# Patient Record
Sex: FEMALE | Race: WHITE | NOT HISPANIC OR LATINO | Employment: OTHER | URBAN - METROPOLITAN AREA
[De-identification: names, ages, dates, MRNs, and addresses within clinical notes are randomized per-mention and may not be internally consistent; named-entity substitution may affect disease eponyms.]

---

## 2023-01-01 ENCOUNTER — HOSPITAL ENCOUNTER (EMERGENCY)
Facility: CLINIC | Age: 68
Discharge: ANOTHER HOSPITAL | DRG: 057 | End: 2023-11-17
Attending: STUDENT IN AN ORGANIZED HEALTH CARE EDUCATION/TRAINING PROGRAM | Admitting: STUDENT IN AN ORGANIZED HEALTH CARE EDUCATION/TRAINING PROGRAM
Payer: COMMERCIAL

## 2023-01-01 ENCOUNTER — APPOINTMENT (OUTPATIENT)
Dept: CT IMAGING | Facility: CLINIC | Age: 68
End: 2023-01-01
Attending: EMERGENCY MEDICINE
Payer: COMMERCIAL

## 2023-01-01 ENCOUNTER — DOCUMENTATION ONLY (OUTPATIENT)
Dept: OTHER | Facility: CLINIC | Age: 68
End: 2023-01-01
Payer: COMMERCIAL

## 2023-01-01 ENCOUNTER — APPOINTMENT (OUTPATIENT)
Dept: GENERAL RADIOLOGY | Facility: CLINIC | Age: 68
DRG: 057 | End: 2023-01-01
Attending: INTERNAL MEDICINE
Payer: COMMERCIAL

## 2023-01-01 ENCOUNTER — TRANSFERRED RECORDS (OUTPATIENT)
Dept: HEALTH INFORMATION MANAGEMENT | Facility: CLINIC | Age: 68
End: 2023-01-01

## 2023-01-01 ENCOUNTER — APPOINTMENT (OUTPATIENT)
Dept: PHYSICAL THERAPY | Facility: CLINIC | Age: 68
DRG: 057 | End: 2023-01-01
Attending: INTERNAL MEDICINE
Payer: COMMERCIAL

## 2023-01-01 ENCOUNTER — APPOINTMENT (OUTPATIENT)
Dept: SPEECH THERAPY | Facility: CLINIC | Age: 68
End: 2023-01-01
Attending: INTERNAL MEDICINE
Payer: COMMERCIAL

## 2023-01-01 ENCOUNTER — MEDICAL CORRESPONDENCE (OUTPATIENT)
Dept: HEALTH INFORMATION MANAGEMENT | Facility: CLINIC | Age: 68
End: 2023-01-01

## 2023-01-01 ENCOUNTER — LAB REQUISITION (OUTPATIENT)
Dept: LAB | Facility: CLINIC | Age: 68
End: 2023-01-01

## 2023-01-01 ENCOUNTER — HOSPITAL ENCOUNTER (INPATIENT)
Facility: CLINIC | Age: 68
LOS: 3 days | Discharge: NURSING, REHAB OR LONG-TERM CARE | DRG: 057 | End: 2023-11-20
Attending: INTERNAL MEDICINE | Admitting: INTERNAL MEDICINE
Payer: COMMERCIAL

## 2023-01-01 ENCOUNTER — APPOINTMENT (OUTPATIENT)
Dept: CARDIOLOGY | Facility: CLINIC | Age: 68
End: 2023-01-01
Attending: NURSE PRACTITIONER
Payer: COMMERCIAL

## 2023-01-01 ENCOUNTER — APPOINTMENT (OUTPATIENT)
Dept: CT IMAGING | Facility: CLINIC | Age: 68
End: 2023-01-01
Attending: STUDENT IN AN ORGANIZED HEALTH CARE EDUCATION/TRAINING PROGRAM
Payer: COMMERCIAL

## 2023-01-01 ENCOUNTER — APPOINTMENT (OUTPATIENT)
Dept: CT IMAGING | Facility: CLINIC | Age: 68
DRG: 057 | End: 2023-01-01
Attending: STUDENT IN AN ORGANIZED HEALTH CARE EDUCATION/TRAINING PROGRAM
Payer: COMMERCIAL

## 2023-01-01 ENCOUNTER — HOSPITAL ENCOUNTER (EMERGENCY)
Facility: CLINIC | Age: 68
Discharge: HOME | End: 2023-11-05
Payer: COMMERCIAL

## 2023-01-01 ENCOUNTER — LAB REQUISITION (OUTPATIENT)
Dept: LAB | Facility: CLINIC | Age: 68
End: 2023-01-01
Payer: COMMERCIAL

## 2023-01-01 ENCOUNTER — APPOINTMENT (OUTPATIENT)
Dept: SPEECH THERAPY | Facility: CLINIC | Age: 68
DRG: 057 | End: 2023-01-01
Attending: INTERNAL MEDICINE
Payer: COMMERCIAL

## 2023-01-01 ENCOUNTER — HOSPITAL ENCOUNTER (OUTPATIENT)
Facility: CLINIC | Age: 68
Setting detail: OBSERVATION
Discharge: OTHER INSTITUTION | End: 2023-11-07
Attending: EMERGENCY MEDICINE | Admitting: INTERNAL MEDICINE
Payer: COMMERCIAL

## 2023-01-01 ENCOUNTER — HOSPITAL ENCOUNTER (EMERGENCY)
Facility: CLINIC | Age: 68
Discharge: HOME | End: 2023-09-05
Attending: STUDENT IN AN ORGANIZED HEALTH CARE EDUCATION/TRAINING PROGRAM | Admitting: STUDENT IN AN ORGANIZED HEALTH CARE EDUCATION/TRAINING PROGRAM
Payer: COMMERCIAL

## 2023-01-01 ENCOUNTER — APPOINTMENT (OUTPATIENT)
Dept: CT IMAGING | Facility: CLINIC | Age: 68
End: 2023-01-01
Attending: NURSE PRACTITIONER
Payer: COMMERCIAL

## 2023-01-01 ENCOUNTER — DOCUMENTATION ONLY (OUTPATIENT)
Dept: OTHER | Facility: CLINIC | Age: 68
End: 2023-01-01

## 2023-01-01 VITALS
HEART RATE: 79 BPM | TEMPERATURE: 99.9 F | SYSTOLIC BLOOD PRESSURE: 150 MMHG | RESPIRATION RATE: 16 BRPM | OXYGEN SATURATION: 99 % | DIASTOLIC BLOOD PRESSURE: 78 MMHG

## 2023-01-01 VITALS
WEIGHT: 133.82 LBS | RESPIRATION RATE: 25 BRPM | HEART RATE: 113 BPM | BODY MASS INDEX: 21.6 KG/M2 | DIASTOLIC BLOOD PRESSURE: 85 MMHG | OXYGEN SATURATION: 93 % | SYSTOLIC BLOOD PRESSURE: 142 MMHG | TEMPERATURE: 100.9 F

## 2023-01-01 VITALS
DIASTOLIC BLOOD PRESSURE: 74 MMHG | HEART RATE: 76 BPM | SYSTOLIC BLOOD PRESSURE: 122 MMHG | HEIGHT: 66 IN | OXYGEN SATURATION: 95 % | BODY MASS INDEX: 22.68 KG/M2 | TEMPERATURE: 98.4 F | RESPIRATION RATE: 18 BRPM | WEIGHT: 141.09 LBS

## 2023-01-01 VITALS
DIASTOLIC BLOOD PRESSURE: 84 MMHG | OXYGEN SATURATION: 97 % | WEIGHT: 139 LBS | BODY MASS INDEX: 22.34 KG/M2 | HEART RATE: 89 BPM | RESPIRATION RATE: 20 BRPM | HEIGHT: 66 IN | TEMPERATURE: 99 F | SYSTOLIC BLOOD PRESSURE: 147 MMHG

## 2023-01-01 DIAGNOSIS — Z86.73 HISTORY OF STROKE: ICD-10-CM

## 2023-01-01 DIAGNOSIS — R50.9 FEVER, UNSPECIFIED FEVER CAUSE: Primary | ICD-10-CM

## 2023-01-01 DIAGNOSIS — R13.10 DYSPHAGIA, UNSPECIFIED TYPE: ICD-10-CM

## 2023-01-01 DIAGNOSIS — Z98.2 S/P VP SHUNT: ICD-10-CM

## 2023-01-01 DIAGNOSIS — G93.40 ENCEPHALOPATHY, UNSPECIFIED: ICD-10-CM

## 2023-01-01 DIAGNOSIS — Z86.79 HX OF INTRACRANIAL HEMORRHAGE: ICD-10-CM

## 2023-01-01 DIAGNOSIS — I69.018 OTHER SYMPTOMS AND SIGNS INVOLVING COGNITIVE FUNCTIONS FOLLOWING NONTRAUMATIC SUBARACHNOID HEMORRHAGE: ICD-10-CM

## 2023-01-01 DIAGNOSIS — L89.151 PRESSURE INJURY OF COCCYGEAL REGION, STAGE 1: ICD-10-CM

## 2023-01-01 DIAGNOSIS — R53.1 LEFT-SIDED WEAKNESS: ICD-10-CM

## 2023-01-01 DIAGNOSIS — G91.1 OBSTRUCTIVE HYDROCEPHALUS (H): ICD-10-CM

## 2023-01-01 DIAGNOSIS — R10.9 UNSPECIFIED ABDOMINAL PAIN: ICD-10-CM

## 2023-01-01 DIAGNOSIS — G91.1 OBSTRUCTIVE HYDROCEPHALUS (H): Primary | ICD-10-CM

## 2023-01-01 DIAGNOSIS — R47.01 APHASIA: ICD-10-CM

## 2023-01-01 DIAGNOSIS — R51.9 NONINTRACTABLE HEADACHE, UNSPECIFIED CHRONICITY PATTERN, UNSPECIFIED HEADACHE TYPE: ICD-10-CM

## 2023-01-01 DIAGNOSIS — I69.091 DYSPHAGIA FOLLOWING NONTRAUMATIC SUBARACHNOID HEMORRHAGE: ICD-10-CM

## 2023-01-01 DIAGNOSIS — I67.1 CEREBRAL ANEURYSM, NONRUPTURED: ICD-10-CM

## 2023-01-01 LAB
ALBUMIN SERPL BCG-MCNC: 3.8 G/DL (ref 3.5–5.2)
ALBUMIN UR-MCNC: 30 MG/DL
ALBUMIN UR-MCNC: 30 MG/DL
ALBUMIN UR-MCNC: NEGATIVE MG/DL
ALP SERPL-CCNC: 86 U/L (ref 35–104)
ALT SERPL W P-5'-P-CCNC: 8 U/L (ref 0–50)
AMORPH CRY #/AREA URNS HPF: ABNORMAL /HPF
ANION GAP SERPL CALCULATED.3IONS-SCNC: 10 MMOL/L (ref 7–15)
ANION GAP SERPL CALCULATED.3IONS-SCNC: 11 MMOL/L (ref 7–15)
ANION GAP SERPL CALCULATED.3IONS-SCNC: 12 MMOL/L (ref 7–15)
ANION GAP SERPL CALCULATED.3IONS-SCNC: 13 MMOL/L (ref 7–15)
ANION GAP SERPL CALCULATED.3IONS-SCNC: 13 MMOL/L (ref 7–15)
ANION GAP SERPL CALCULATED.3IONS-SCNC: 14 MMOL/L (ref 7–15)
APPEARANCE UR: ABNORMAL
APTT PPP: 26 SECONDS (ref 22–38)
APTT PPP: 26 SECONDS (ref 22–38)
AST SERPL W P-5'-P-CCNC: 12 U/L (ref 0–45)
BACTERIA #/AREA URNS HPF: ABNORMAL /HPF
BACTERIA BLD CULT: NO GROWTH
BACTERIA BLD CULT: NO GROWTH
BACTERIA UR CULT: ABNORMAL
BACTERIA UR CULT: ABNORMAL
BACTERIA UR CULT: NO GROWTH
BACTERIA UR CULT: NORMAL
BACTERIA UR CULT: NORMAL
BASOPHILS # BLD AUTO: 0 10E3/UL (ref 0–0.2)
BASOPHILS # BLD AUTO: 0 10E3/UL (ref 0–0.2)
BASOPHILS # BLD AUTO: 0.1 10E3/UL (ref 0–0.2)
BASOPHILS NFR BLD AUTO: 0 %
BASOPHILS NFR BLD AUTO: 1 %
BASOPHILS NFR BLD AUTO: 1 %
BILIRUB SERPL-MCNC: 0.4 MG/DL
BILIRUB UR QL STRIP: NEGATIVE
BUN SERPL-MCNC: 10.3 MG/DL (ref 8–23)
BUN SERPL-MCNC: 13 MG/DL (ref 8–23)
BUN SERPL-MCNC: 13.9 MG/DL (ref 8–23)
BUN SERPL-MCNC: 14.5 MG/DL (ref 8–23)
BUN SERPL-MCNC: 16.2 MG/DL (ref 8–23)
BUN SERPL-MCNC: 18.3 MG/DL (ref 8–23)
CALCIUM SERPL-MCNC: 9.1 MG/DL (ref 8.8–10.2)
CALCIUM SERPL-MCNC: 9.2 MG/DL (ref 8.8–10.2)
CALCIUM SERPL-MCNC: 9.5 MG/DL (ref 8.8–10.2)
CALCIUM SERPL-MCNC: 9.6 MG/DL (ref 8.8–10.2)
CHLORIDE SERPL-SCNC: 100 MMOL/L (ref 98–107)
CHLORIDE SERPL-SCNC: 102 MMOL/L (ref 98–107)
CHLORIDE SERPL-SCNC: 103 MMOL/L (ref 98–107)
CHLORIDE SERPL-SCNC: 104 MMOL/L (ref 98–107)
CHLORIDE SERPL-SCNC: 99 MMOL/L (ref 98–107)
CHLORIDE SERPL-SCNC: 99 MMOL/L (ref 98–107)
CHOLEST SERPL-MCNC: 141 MG/DL
COLOR UR AUTO: YELLOW
CREAT SERPL-MCNC: 0.7 MG/DL (ref 0.51–0.95)
CREAT SERPL-MCNC: 0.7 MG/DL (ref 0.51–0.95)
CREAT SERPL-MCNC: 0.74 MG/DL (ref 0.51–0.95)
CREAT SERPL-MCNC: 0.75 MG/DL (ref 0.51–0.95)
CREAT SERPL-MCNC: 0.79 MG/DL (ref 0.51–0.95)
CREAT SERPL-MCNC: 0.8 MG/DL (ref 0.51–0.95)
DEPRECATED HCO3 PLAS-SCNC: 22 MMOL/L (ref 22–29)
DEPRECATED HCO3 PLAS-SCNC: 23 MMOL/L (ref 22–29)
DEPRECATED HCO3 PLAS-SCNC: 23 MMOL/L (ref 22–29)
DEPRECATED HCO3 PLAS-SCNC: 25 MMOL/L (ref 22–29)
DEPRECATED HCO3 PLAS-SCNC: 26 MMOL/L (ref 22–29)
DEPRECATED HCO3 PLAS-SCNC: 27 MMOL/L (ref 22–29)
EGFRCR SERPLBLD CKD-EPI 2021: 81 ML/MIN/1.73M2
EGFRCR SERPLBLD CKD-EPI 2021: 86 ML/MIN/1.73M2
EGFRCR SERPLBLD CKD-EPI 2021: 87 ML/MIN/1.73M2
EGFRCR SERPLBLD CKD-EPI 2021: >90 ML/MIN/1.73M2
EOSINOPHIL # BLD AUTO: 0.1 10E3/UL (ref 0–0.7)
EOSINOPHIL # BLD AUTO: 0.2 10E3/UL (ref 0–0.7)
EOSINOPHIL # BLD AUTO: 0.3 10E3/UL (ref 0–0.7)
EOSINOPHIL NFR BLD AUTO: 1 %
EOSINOPHIL NFR BLD AUTO: 2 %
EOSINOPHIL NFR BLD AUTO: 3 %
ERYTHROCYTE [DISTWIDTH] IN BLOOD BY AUTOMATED COUNT: 13.1 % (ref 10–15)
ERYTHROCYTE [DISTWIDTH] IN BLOOD BY AUTOMATED COUNT: 13.1 % (ref 10–15)
ERYTHROCYTE [DISTWIDTH] IN BLOOD BY AUTOMATED COUNT: 13.2 % (ref 10–15)
ERYTHROCYTE [DISTWIDTH] IN BLOOD BY AUTOMATED COUNT: 13.2 % (ref 10–15)
ERYTHROCYTE [DISTWIDTH] IN BLOOD BY AUTOMATED COUNT: 14.2 % (ref 10–15)
ERYTHROCYTE [DISTWIDTH] IN BLOOD BY AUTOMATED COUNT: 18.4 % (ref 10–15)
GFR SERPL CREATININE-BSD FRML MDRD: 79 ML/MIN/1.73M2
GFR SERPL CREATININE-BSD FRML MDRD: >90 ML/MIN/1.73M2
GLUCOSE BLDC GLUCOMTR-MCNC: 115 MG/DL (ref 70–99)
GLUCOSE BLDC GLUCOMTR-MCNC: 91 MG/DL (ref 70–99)
GLUCOSE BLDC GLUCOMTR-MCNC: 94 MG/DL (ref 70–99)
GLUCOSE SERPL-MCNC: 110 MG/DL (ref 70–99)
GLUCOSE SERPL-MCNC: 92 MG/DL (ref 70–99)
GLUCOSE SERPL-MCNC: 92 MG/DL (ref 70–99)
GLUCOSE SERPL-MCNC: 94 MG/DL (ref 70–99)
GLUCOSE SERPL-MCNC: 96 MG/DL (ref 70–99)
GLUCOSE SERPL-MCNC: 97 MG/DL (ref 70–99)
GLUCOSE UR STRIP-MCNC: NEGATIVE MG/DL
HBA1C MFR BLD: 5.4 %
HCT VFR BLD AUTO: 35.1 % (ref 35–47)
HCT VFR BLD AUTO: 37 % (ref 35–47)
HCT VFR BLD AUTO: 37.2 % (ref 35–47)
HCT VFR BLD AUTO: 37.5 % (ref 35–47)
HCT VFR BLD AUTO: 38.1 % (ref 35–47)
HCT VFR BLD AUTO: 40.1 % (ref 35–47)
HDLC SERPL-MCNC: 35 MG/DL
HGB BLD-MCNC: 10.5 G/DL (ref 11.7–15.7)
HGB BLD-MCNC: 11.7 G/DL (ref 11.7–15.7)
HGB BLD-MCNC: 12.1 G/DL (ref 11.7–15.7)
HGB BLD-MCNC: 12.2 G/DL (ref 11.7–15.7)
HGB BLD-MCNC: 12.3 G/DL (ref 11.7–15.7)
HGB BLD-MCNC: 13.1 G/DL (ref 11.7–15.7)
HGB UR QL STRIP: ABNORMAL
HGB UR QL STRIP: ABNORMAL
HGB UR QL STRIP: NEGATIVE
IMM GRANULOCYTES # BLD: 0 10E3/UL
IMM GRANULOCYTES # BLD: 0 10E3/UL
IMM GRANULOCYTES # BLD: 0.1 10E3/UL
IMM GRANULOCYTES NFR BLD: 0 %
IMM GRANULOCYTES NFR BLD: 1 %
IMM GRANULOCYTES NFR BLD: 1 %
INR PPP: 1.11 (ref 0.85–1.15)
INR PPP: 1.12 (ref 0.85–1.15)
IRON BINDING CAPACITY (ROCHE): 232 UG/DL (ref 240–430)
IRON SATN MFR SERPL: 13 % (ref 15–46)
IRON SERPL-MCNC: 31 UG/DL (ref 37–145)
KETONES UR STRIP-MCNC: NEGATIVE MG/DL
LACTATE SERPL-SCNC: 0.8 MMOL/L (ref 0.7–2)
LACTATE SERPL-SCNC: 1 MMOL/L (ref 0.7–2)
LDLC SERPL CALC-MCNC: 72 MG/DL
LEUKOCYTE ESTERASE UR QL STRIP: ABNORMAL
LEUKOCYTE ESTERASE UR QL STRIP: NEGATIVE
LEVETIRACETAM SERPL-MCNC: 25.5 ΜG/ML (ref 10–40)
LEVETIRACETAM SERPL-MCNC: 30.8 ΜG/ML (ref 10–40)
LIPASE SERPL-CCNC: 26 U/L (ref 13–60)
LVEF ECHO: NORMAL
LYMPHOCYTES # BLD AUTO: 1.5 10E3/UL (ref 0.8–5.3)
LYMPHOCYTES # BLD AUTO: 1.8 10E3/UL (ref 0.8–5.3)
LYMPHOCYTES # BLD AUTO: 2.3 10E3/UL (ref 0.8–5.3)
LYMPHOCYTES NFR BLD AUTO: 16 %
LYMPHOCYTES NFR BLD AUTO: 27 %
LYMPHOCYTES NFR BLD AUTO: 32 %
MAGNESIUM SERPL-MCNC: 1.9 MG/DL (ref 1.7–2.3)
MAGNESIUM SERPL-MCNC: 1.9 MG/DL (ref 1.7–2.3)
MCH RBC QN AUTO: 23.8 PG (ref 26.5–33)
MCH RBC QN AUTO: 27.9 PG (ref 26.5–33)
MCH RBC QN AUTO: 28.6 PG (ref 26.5–33)
MCH RBC QN AUTO: 28.6 PG (ref 26.5–33)
MCH RBC QN AUTO: 28.7 PG (ref 26.5–33)
MCH RBC QN AUTO: 29 PG (ref 26.5–33)
MCHC RBC AUTO-ENTMCNC: 29.9 G/DL (ref 31.5–36.5)
MCHC RBC AUTO-ENTMCNC: 31.5 G/DL (ref 31.5–36.5)
MCHC RBC AUTO-ENTMCNC: 32 G/DL (ref 31.5–36.5)
MCHC RBC AUTO-ENTMCNC: 32.7 G/DL (ref 31.5–36.5)
MCHC RBC AUTO-ENTMCNC: 32.7 G/DL (ref 31.5–36.5)
MCHC RBC AUTO-ENTMCNC: 32.8 G/DL (ref 31.5–36.5)
MCV RBC AUTO: 79 FL (ref 78–100)
MCV RBC AUTO: 85 FL (ref 78–100)
MCV RBC AUTO: 88 FL (ref 78–100)
MCV RBC AUTO: 89 FL (ref 78–100)
MCV RBC AUTO: 89 FL (ref 78–100)
MCV RBC AUTO: 91 FL (ref 78–100)
MONOCYTES # BLD AUTO: 0.5 10E3/UL (ref 0–1.3)
MONOCYTES # BLD AUTO: 0.5 10E3/UL (ref 0–1.3)
MONOCYTES # BLD AUTO: 0.7 10E3/UL (ref 0–1.3)
MONOCYTES NFR BLD AUTO: 6 %
MONOCYTES NFR BLD AUTO: 7 %
MONOCYTES NFR BLD AUTO: 9 %
MUCOUS THREADS #/AREA URNS LPF: PRESENT /LPF
NEUTROPHILS # BLD AUTO: 3.6 10E3/UL (ref 1.6–8.3)
NEUTROPHILS # BLD AUTO: 4.2 10E3/UL (ref 1.6–8.3)
NEUTROPHILS # BLD AUTO: 8.5 10E3/UL (ref 1.6–8.3)
NEUTROPHILS NFR BLD AUTO: 57 %
NEUTROPHILS NFR BLD AUTO: 60 %
NEUTROPHILS NFR BLD AUTO: 76 %
NITRATE UR QL: NEGATIVE
NITRATE UR QL: POSITIVE
NONHDLC SERPL-MCNC: 106 MG/DL
NRBC # BLD AUTO: 0 10E3/UL
NRBC BLD AUTO-RTO: 0 /100
PH UR STRIP: 5 [PH] (ref 5–7)
PH UR STRIP: 6 [PH] (ref 5–7)
PH UR STRIP: 6 [PH] (ref 5–7)
PH UR STRIP: 7 [PH] (ref 5–7)
PH UR STRIP: 8 [PH] (ref 5–7)
PLATELET # BLD AUTO: 267 10E3/UL (ref 150–450)
PLATELET # BLD AUTO: 281 10E3/UL (ref 150–450)
PLATELET # BLD AUTO: 325 10E3/UL (ref 150–450)
PLATELET # BLD AUTO: 409 10E3/UL (ref 150–450)
PLATELET # BLD AUTO: 413 10E3/UL (ref 150–450)
PLATELET # BLD AUTO: 426 10E3/UL (ref 150–450)
POTASSIUM SERPL-SCNC: 3.3 MMOL/L (ref 3.4–5.3)
POTASSIUM SERPL-SCNC: 3.7 MMOL/L (ref 3.4–5.3)
POTASSIUM SERPL-SCNC: 3.7 MMOL/L (ref 3.4–5.3)
POTASSIUM SERPL-SCNC: 3.8 MMOL/L (ref 3.4–5.3)
POTASSIUM SERPL-SCNC: 3.9 MMOL/L (ref 3.4–5.3)
POTASSIUM SERPL-SCNC: 4 MMOL/L (ref 3.4–5.3)
POTASSIUM SERPL-SCNC: 4 MMOL/L (ref 3.4–5.3)
POTASSIUM SERPL-SCNC: 4.1 MMOL/L (ref 3.4–5.3)
PROT SERPL-MCNC: 6.9 G/DL (ref 6.4–8.3)
RBC # BLD AUTO: 4.09 10E6/UL (ref 3.8–5.2)
RBC # BLD AUTO: 4.26 10E6/UL (ref 3.8–5.2)
RBC # BLD AUTO: 4.28 10E6/UL (ref 3.8–5.2)
RBC # BLD AUTO: 4.34 10E6/UL (ref 3.8–5.2)
RBC # BLD AUTO: 4.42 10E6/UL (ref 3.8–5.2)
RBC # BLD AUTO: 4.52 10E6/UL (ref 3.8–5.2)
RBC URINE: 0 /HPF
RBC URINE: 0 /HPF
RBC URINE: 11 /HPF
RBC URINE: 23 /HPF
RBC URINE: 3 /HPF
SODIUM SERPL-SCNC: 134 MMOL/L (ref 135–145)
SODIUM SERPL-SCNC: 136 MMOL/L (ref 135–145)
SODIUM SERPL-SCNC: 137 MMOL/L (ref 135–145)
SODIUM SERPL-SCNC: 138 MMOL/L (ref 136–145)
SODIUM SERPL-SCNC: 139 MMOL/L (ref 135–145)
SODIUM SERPL-SCNC: 142 MMOL/L (ref 136–145)
SP GR UR STRIP: 1.01 (ref 1–1.03)
SP GR UR STRIP: 1.02 (ref 1–1.03)
SP GR UR STRIP: 1.02 (ref 1–1.03)
SP GR UR STRIP: 1.03 (ref 1–1.03)
SP GR UR STRIP: >=1.03 (ref 1–1.03)
SQUAMOUS EPITHELIAL: 1 /HPF
SQUAMOUS EPITHELIAL: 13 /HPF
SQUAMOUS EPITHELIAL: <1 /HPF
SQUAMOUS EPITHELIAL: <1 /HPF
TRIGL SERPL-MCNC: 172 MG/DL
TROPONIN T SERPL HS-MCNC: 11 NG/L
TROPONIN T SERPL HS-MCNC: 12 NG/L
TROPONIN T SERPL HS-MCNC: 13 NG/L
UROBILINOGEN UR STRIP-MCNC: 2 MG/DL
UROBILINOGEN UR STRIP-MCNC: NORMAL MG/DL
WBC # BLD AUTO: 11.3 10E3/UL (ref 4–11)
WBC # BLD AUTO: 5.8 10E3/UL (ref 4–11)
WBC # BLD AUTO: 6.7 10E3/UL (ref 4–11)
WBC # BLD AUTO: 6.8 10E3/UL (ref 4–11)
WBC # BLD AUTO: 7.4 10E3/UL (ref 4–11)
WBC # BLD AUTO: 8.7 10E3/UL (ref 4–11)
WBC CLUMPS #/AREA URNS HPF: PRESENT /HPF
WBC URINE: 0 /HPF
WBC URINE: 63 /HPF
WBC URINE: 82 /HPF
WBC URINE: >182 /HPF
WBC URINE: >182 /HPF

## 2023-01-01 PROCEDURE — 250N000013 HC RX MED GY IP 250 OP 250 PS 637: Performed by: INTERNAL MEDICINE

## 2023-01-01 PROCEDURE — G0378 HOSPITAL OBSERVATION PER HR: HCPCS

## 2023-01-01 PROCEDURE — 85610 PROTHROMBIN TIME: CPT | Performed by: EMERGENCY MEDICINE

## 2023-01-01 PROCEDURE — 70498 CT ANGIOGRAPHY NECK: CPT

## 2023-01-01 PROCEDURE — 99207 PR NO BILLABLE SERVICE THIS VISIT: CPT | Performed by: NURSE PRACTITIONER

## 2023-01-01 PROCEDURE — 85730 THROMBOPLASTIN TIME PARTIAL: CPT | Performed by: STUDENT IN AN ORGANIZED HEALTH CARE EDUCATION/TRAINING PROGRAM

## 2023-01-01 PROCEDURE — 258N000003 HC RX IP 258 OP 636: Performed by: INTERNAL MEDICINE

## 2023-01-01 PROCEDURE — 36415 COLL VENOUS BLD VENIPUNCTURE: CPT | Performed by: INTERNAL MEDICINE

## 2023-01-01 PROCEDURE — 99497 ADVNCD CARE PLAN 30 MIN: CPT | Mod: 25 | Performed by: INTERNAL MEDICINE

## 2023-01-01 PROCEDURE — 99418 PROLNG IP/OBS E/M EA 15 MIN: CPT | Performed by: INTERNAL MEDICINE

## 2023-01-01 PROCEDURE — 87040 BLOOD CULTURE FOR BACTERIA: CPT | Performed by: HOSPITALIST

## 2023-01-01 PROCEDURE — 85025 COMPLETE CBC W/AUTO DIFF WBC: CPT | Performed by: EMERGENCY MEDICINE

## 2023-01-01 PROCEDURE — 93010 ELECTROCARDIOGRAM REPORT: CPT | Performed by: STUDENT IN AN ORGANIZED HEALTH CARE EDUCATION/TRAINING PROGRAM

## 2023-01-01 PROCEDURE — 84132 ASSAY OF SERUM POTASSIUM: CPT | Performed by: INTERNAL MEDICINE

## 2023-01-01 PROCEDURE — 250N000011 HC RX IP 250 OP 636: Mod: JZ | Performed by: INTERNAL MEDICINE

## 2023-01-01 PROCEDURE — 80053 COMPREHEN METABOLIC PANEL: CPT | Performed by: STUDENT IN AN ORGANIZED HEALTH CARE EDUCATION/TRAINING PROGRAM

## 2023-01-01 PROCEDURE — 96375 TX/PRO/DX INJ NEW DRUG ADDON: CPT

## 2023-01-01 PROCEDURE — 70450 CT HEAD/BRAIN W/O DYE: CPT

## 2023-01-01 PROCEDURE — 96374 THER/PROPH/DIAG INJ IV PUSH: CPT | Mod: XU

## 2023-01-01 PROCEDURE — 36415 COLL VENOUS BLD VENIPUNCTURE: CPT | Performed by: STUDENT IN AN ORGANIZED HEALTH CARE EDUCATION/TRAINING PROGRAM

## 2023-01-01 PROCEDURE — P9604 ONE-WAY ALLOW PRORATED TRIP: HCPCS | Mod: ORL | Performed by: FAMILY MEDICINE

## 2023-01-01 PROCEDURE — 93306 TTE W/DOPPLER COMPLETE: CPT | Mod: 26 | Performed by: INTERNAL MEDICINE

## 2023-01-01 PROCEDURE — 97530 THERAPEUTIC ACTIVITIES: CPT | Mod: GP | Performed by: PHYSICAL THERAPIST

## 2023-01-01 PROCEDURE — 99232 SBSQ HOSP IP/OBS MODERATE 35: CPT | Performed by: NURSE PRACTITIONER

## 2023-01-01 PROCEDURE — P9604 ONE-WAY ALLOW PRORATED TRIP: HCPCS | Performed by: FAMILY MEDICINE

## 2023-01-01 PROCEDURE — 80048 BASIC METABOLIC PNL TOTAL CA: CPT | Performed by: EMERGENCY MEDICINE

## 2023-01-01 PROCEDURE — 999N000208 ECHOCARDIOGRAM COMPLETE

## 2023-01-01 PROCEDURE — 82310 ASSAY OF CALCIUM: CPT | Performed by: FAMILY MEDICINE

## 2023-01-01 PROCEDURE — 250N000013 HC RX MED GY IP 250 OP 250 PS 637: Performed by: NURSE PRACTITIONER

## 2023-01-01 PROCEDURE — 99232 SBSQ HOSP IP/OBS MODERATE 35: CPT | Performed by: HOSPITALIST

## 2023-01-01 PROCEDURE — 85025 COMPLETE CBC W/AUTO DIFF WBC: CPT | Performed by: STUDENT IN AN ORGANIZED HEALTH CARE EDUCATION/TRAINING PROGRAM

## 2023-01-01 PROCEDURE — 99291 CRITICAL CARE FIRST HOUR: CPT | Mod: 25 | Performed by: STUDENT IN AN ORGANIZED HEALTH CARE EDUCATION/TRAINING PROGRAM

## 2023-01-01 PROCEDURE — 250N000009 HC RX 250: Performed by: STUDENT IN AN ORGANIZED HEALTH CARE EDUCATION/TRAINING PROGRAM

## 2023-01-01 PROCEDURE — 87086 URINE CULTURE/COLONY COUNT: CPT | Performed by: NURSE PRACTITIONER

## 2023-01-01 PROCEDURE — 99232 SBSQ HOSP IP/OBS MODERATE 35: CPT | Mod: 25 | Performed by: INTERNAL MEDICINE

## 2023-01-01 PROCEDURE — 84484 ASSAY OF TROPONIN QUANT: CPT | Performed by: EMERGENCY MEDICINE

## 2023-01-01 PROCEDURE — 258N000003 HC RX IP 258 OP 636: Performed by: STUDENT IN AN ORGANIZED HEALTH CARE EDUCATION/TRAINING PROGRAM

## 2023-01-01 PROCEDURE — 36415 COLL VENOUS BLD VENIPUNCTURE: CPT | Performed by: HOSPITALIST

## 2023-01-01 PROCEDURE — 250N000009 HC RX 250: Performed by: EMERGENCY MEDICINE

## 2023-01-01 PROCEDURE — 99222 1ST HOSP IP/OBS MODERATE 55: CPT | Performed by: INTERNAL MEDICINE

## 2023-01-01 PROCEDURE — 36415 COLL VENOUS BLD VENIPUNCTURE: CPT | Mod: ORL | Performed by: FAMILY MEDICINE

## 2023-01-01 PROCEDURE — 71045 X-RAY EXAM CHEST 1 VIEW: CPT

## 2023-01-01 PROCEDURE — 83735 ASSAY OF MAGNESIUM: CPT | Performed by: STUDENT IN AN ORGANIZED HEALTH CARE EDUCATION/TRAINING PROGRAM

## 2023-01-01 PROCEDURE — 82962 GLUCOSE BLOOD TEST: CPT

## 2023-01-01 PROCEDURE — 93005 ELECTROCARDIOGRAM TRACING: CPT | Performed by: STUDENT IN AN ORGANIZED HEALTH CARE EDUCATION/TRAINING PROGRAM

## 2023-01-01 PROCEDURE — 81001 URINALYSIS AUTO W/SCOPE: CPT | Mod: ORL | Performed by: FAMILY MEDICINE

## 2023-01-01 PROCEDURE — 85027 COMPLETE CBC AUTOMATED: CPT | Performed by: FAMILY MEDICINE

## 2023-01-01 PROCEDURE — 87086 URINE CULTURE/COLONY COUNT: CPT | Performed by: STUDENT IN AN ORGANIZED HEALTH CARE EDUCATION/TRAINING PROGRAM

## 2023-01-01 PROCEDURE — 99233 SBSQ HOSP IP/OBS HIGH 50: CPT | Performed by: INTERNAL MEDICINE

## 2023-01-01 PROCEDURE — 92610 EVALUATE SWALLOWING FUNCTION: CPT | Mod: GN | Performed by: SPEECH-LANGUAGE PATHOLOGIST

## 2023-01-01 PROCEDURE — C9113 INJ PANTOPRAZOLE SODIUM, VIA: HCPCS | Mod: JZ | Performed by: INTERNAL MEDICINE

## 2023-01-01 PROCEDURE — 71045 X-RAY EXAM CHEST 1 VIEW: CPT | Mod: XS

## 2023-01-01 PROCEDURE — 83605 ASSAY OF LACTIC ACID: CPT | Performed by: STUDENT IN AN ORGANIZED HEALTH CARE EDUCATION/TRAINING PROGRAM

## 2023-01-01 PROCEDURE — 84484 ASSAY OF TROPONIN QUANT: CPT | Performed by: STUDENT IN AN ORGANIZED HEALTH CARE EDUCATION/TRAINING PROGRAM

## 2023-01-01 PROCEDURE — 99239 HOSP IP/OBS DSCHRG MGMT >30: CPT | Performed by: NURSE PRACTITIONER

## 2023-01-01 PROCEDURE — 80177 DRUG SCRN QUAN LEVETIRACETAM: CPT | Performed by: FAMILY MEDICINE

## 2023-01-01 PROCEDURE — 255N000002 HC RX 255 OP 636: Performed by: INTERNAL MEDICINE

## 2023-01-01 PROCEDURE — 93010 ELECTROCARDIOGRAM REPORT: CPT | Performed by: EMERGENCY MEDICINE

## 2023-01-01 PROCEDURE — 120N000001 HC R&B MED SURG/OB

## 2023-01-01 PROCEDURE — 80061 LIPID PANEL: CPT | Performed by: NURSE PRACTITIONER

## 2023-01-01 PROCEDURE — 80048 BASIC METABOLIC PNL TOTAL CA: CPT | Mod: ORL | Performed by: FAMILY MEDICINE

## 2023-01-01 PROCEDURE — 92526 ORAL FUNCTION THERAPY: CPT | Mod: GN | Performed by: SPEECH-LANGUAGE PATHOLOGIST

## 2023-01-01 PROCEDURE — 82310 ASSAY OF CALCIUM: CPT | Performed by: INTERNAL MEDICINE

## 2023-01-01 PROCEDURE — 96376 TX/PRO/DX INJ SAME DRUG ADON: CPT

## 2023-01-01 PROCEDURE — 250N000011 HC RX IP 250 OP 636: Performed by: EMERGENCY MEDICINE

## 2023-01-01 PROCEDURE — G0463 HOSPITAL OUTPT CLINIC VISIT: HCPCS

## 2023-01-01 PROCEDURE — 96374 THER/PROPH/DIAG INJ IV PUSH: CPT

## 2023-01-01 PROCEDURE — 96376 TX/PRO/DX INJ SAME DRUG ADON: CPT | Mod: XU

## 2023-01-01 PROCEDURE — 83036 HEMOGLOBIN GLYCOSYLATED A1C: CPT | Performed by: NURSE PRACTITIONER

## 2023-01-01 PROCEDURE — 80177 DRUG SCRN QUAN LEVETIRACETAM: CPT | Performed by: INTERNAL MEDICINE

## 2023-01-01 PROCEDURE — 99221 1ST HOSP IP/OBS SF/LOW 40: CPT | Performed by: PHYSICIAN ASSISTANT

## 2023-01-01 PROCEDURE — 85048 AUTOMATED LEUKOCYTE COUNT: CPT | Performed by: INTERNAL MEDICINE

## 2023-01-01 PROCEDURE — 36415 COLL VENOUS BLD VENIPUNCTURE: CPT | Performed by: EMERGENCY MEDICINE

## 2023-01-01 PROCEDURE — 83735 ASSAY OF MAGNESIUM: CPT | Performed by: FAMILY MEDICINE

## 2023-01-01 PROCEDURE — 92523 SPEECH SOUND LANG COMPREHEN: CPT | Mod: GN | Performed by: SPEECH-LANGUAGE PATHOLOGIST

## 2023-01-01 PROCEDURE — 93005 ELECTROCARDIOGRAM TRACING: CPT

## 2023-01-01 PROCEDURE — 70496 CT ANGIOGRAPHY HEAD: CPT

## 2023-01-01 PROCEDURE — 83690 ASSAY OF LIPASE: CPT | Performed by: STUDENT IN AN ORGANIZED HEALTH CARE EDUCATION/TRAINING PROGRAM

## 2023-01-01 PROCEDURE — 83550 IRON BINDING TEST: CPT | Mod: ORL | Performed by: FAMILY MEDICINE

## 2023-01-01 PROCEDURE — 85730 THROMBOPLASTIN TIME PARTIAL: CPT | Performed by: EMERGENCY MEDICINE

## 2023-01-01 PROCEDURE — 96360 HYDRATION IV INFUSION INIT: CPT

## 2023-01-01 PROCEDURE — 81001 URINALYSIS AUTO W/SCOPE: CPT | Performed by: EMERGENCY MEDICINE

## 2023-01-01 PROCEDURE — 83605 ASSAY OF LACTIC ACID: CPT | Performed by: NURSE PRACTITIONER

## 2023-01-01 PROCEDURE — 36415 COLL VENOUS BLD VENIPUNCTURE: CPT | Performed by: NURSE PRACTITIONER

## 2023-01-01 PROCEDURE — 250N000013 HC RX MED GY IP 250 OP 250 PS 637: Performed by: HOSPITALIST

## 2023-01-01 PROCEDURE — G0425 INPT/ED TELECONSULT30: HCPCS | Mod: G0 | Performed by: NURSE PRACTITIONER

## 2023-01-01 PROCEDURE — 85610 PROTHROMBIN TIME: CPT | Performed by: STUDENT IN AN ORGANIZED HEALTH CARE EDUCATION/TRAINING PROGRAM

## 2023-01-01 PROCEDURE — 99285 EMERGENCY DEPT VISIT HI MDM: CPT | Mod: 25

## 2023-01-01 PROCEDURE — 250N000011 HC RX IP 250 OP 636: Performed by: STUDENT IN AN ORGANIZED HEALTH CARE EDUCATION/TRAINING PROGRAM

## 2023-01-01 PROCEDURE — 81001 URINALYSIS AUTO W/SCOPE: CPT | Performed by: STUDENT IN AN ORGANIZED HEALTH CARE EDUCATION/TRAINING PROGRAM

## 2023-01-01 PROCEDURE — 87086 URINE CULTURE/COLONY COUNT: CPT | Mod: ORL | Performed by: FAMILY MEDICINE

## 2023-01-01 PROCEDURE — 81003 URINALYSIS AUTO W/O SCOPE: CPT | Performed by: NURSE PRACTITIONER

## 2023-01-01 PROCEDURE — 97162 PT EVAL MOD COMPLEX 30 MIN: CPT | Mod: GP | Performed by: PHYSICAL THERAPIST

## 2023-01-01 PROCEDURE — G1010 CDSM STANSON: HCPCS

## 2023-01-01 PROCEDURE — 80048 BASIC METABOLIC PNL TOTAL CA: CPT | Performed by: STUDENT IN AN ORGANIZED HEALTH CARE EDUCATION/TRAINING PROGRAM

## 2023-01-01 PROCEDURE — 99223 1ST HOSP IP/OBS HIGH 75: CPT | Performed by: INTERNAL MEDICINE

## 2023-01-01 PROCEDURE — 99284 EMERGENCY DEPT VISIT MOD MDM: CPT | Performed by: STUDENT IN AN ORGANIZED HEALTH CARE EDUCATION/TRAINING PROGRAM

## 2023-01-01 PROCEDURE — 84132 ASSAY OF SERUM POTASSIUM: CPT | Performed by: HOSPITALIST

## 2023-01-01 PROCEDURE — 99285 EMERGENCY DEPT VISIT HI MDM: CPT | Mod: 25 | Performed by: STUDENT IN AN ORGANIZED HEALTH CARE EDUCATION/TRAINING PROGRAM

## 2023-01-01 PROCEDURE — 87086 URINE CULTURE/COLONY COUNT: CPT | Performed by: EMERGENCY MEDICINE

## 2023-01-01 PROCEDURE — 99285 EMERGENCY DEPT VISIT HI MDM: CPT | Mod: 25 | Performed by: EMERGENCY MEDICINE

## 2023-01-01 RX ORDER — ACETAMINOPHEN 325 MG/1
650 TABLET ORAL EVERY 8 HOURS PRN
Status: DISCONTINUED | OUTPATIENT
Start: 2023-01-01 | End: 2023-01-01

## 2023-01-01 RX ORDER — NICOTINE 21 MG/24HR
1 PATCH, TRANSDERMAL 24 HOURS TRANSDERMAL DAILY
Status: DISCONTINUED | OUTPATIENT
Start: 2023-01-01 | End: 2023-01-01 | Stop reason: HOSPADM

## 2023-01-01 RX ORDER — POLYETHYLENE GLYCOL 3350 17 G/17G
17 POWDER, FOR SOLUTION ORAL DAILY PRN
COMMUNITY
Start: 2023-01-01

## 2023-01-01 RX ORDER — LISINOPRIL 5 MG/1
5 TABLET ORAL DAILY
COMMUNITY
Start: 2023-01-01

## 2023-01-01 RX ORDER — LEVETIRACETAM 10 MG/ML
1000 INJECTION INTRAVASCULAR 2 TIMES DAILY
Status: DISCONTINUED | OUTPATIENT
Start: 2023-01-01 | End: 2023-01-01

## 2023-01-01 RX ORDER — AMOXICILLIN 250 MG
1 CAPSULE ORAL 2 TIMES DAILY PRN
Status: DISCONTINUED | OUTPATIENT
Start: 2023-01-01 | End: 2023-01-01 | Stop reason: HOSPADM

## 2023-01-01 RX ORDER — CLOPIDOGREL BISULFATE 75 MG/1
75 TABLET ORAL DAILY
Status: DISCONTINUED | OUTPATIENT
Start: 2023-01-01 | End: 2023-01-01 | Stop reason: HOSPADM

## 2023-01-01 RX ORDER — CEPHALEXIN 250 MG/5ML
500 POWDER, FOR SUSPENSION ORAL 3 TIMES DAILY
Status: DISCONTINUED | OUTPATIENT
Start: 2023-01-01 | End: 2023-01-01

## 2023-01-01 RX ORDER — ASPIRIN 81 MG/1
81 TABLET ORAL DAILY
Status: DISCONTINUED | OUTPATIENT
Start: 2023-01-01 | End: 2023-01-01 | Stop reason: HOSPADM

## 2023-01-01 RX ORDER — NICOTINE 21 MG/24HR
1 PATCH, TRANSDERMAL 24 HOURS TRANSDERMAL EVERY 24 HOURS
COMMUNITY
End: 2023-01-01

## 2023-01-01 RX ORDER — LEVETIRACETAM 100 MG/ML
10 SOLUTION ORAL 2 TIMES DAILY
COMMUNITY
Start: 2023-01-01

## 2023-01-01 RX ORDER — ASPIRIN 81 MG/1
81 TABLET ORAL DAILY
COMMUNITY

## 2023-01-01 RX ORDER — SODIUM CHLORIDE 9 MG/ML
INJECTION, SOLUTION INTRAVENOUS CONTINUOUS
Status: DISCONTINUED | OUTPATIENT
Start: 2023-01-01 | End: 2023-01-01 | Stop reason: HOSPADM

## 2023-01-01 RX ORDER — PANTOPRAZOLE SODIUM 40 MG/1
40 TABLET, DELAYED RELEASE ORAL DAILY
COMMUNITY
Start: 2023-01-01

## 2023-01-01 RX ORDER — CEFTRIAXONE 1 G/1
1 INJECTION, POWDER, FOR SOLUTION INTRAMUSCULAR; INTRAVENOUS EVERY 24 HOURS
COMMUNITY
Start: 2023-01-01 | End: 2023-01-01

## 2023-01-01 RX ORDER — SENNOSIDES 8.6 MG
1 TABLET ORAL 2 TIMES DAILY PRN
COMMUNITY

## 2023-01-01 RX ORDER — LIDOCAINE 40 MG/G
CREAM TOPICAL
Status: DISCONTINUED | OUTPATIENT
Start: 2023-01-01 | End: 2023-01-01 | Stop reason: HOSPADM

## 2023-01-01 RX ORDER — ENOXAPARIN SODIUM 100 MG/ML
40 INJECTION SUBCUTANEOUS EVERY 24 HOURS
Status: DISCONTINUED | OUTPATIENT
Start: 2023-01-01 | End: 2023-01-01 | Stop reason: HOSPADM

## 2023-01-01 RX ORDER — SODIUM CHLORIDE, SODIUM LACTATE, POTASSIUM CHLORIDE, CALCIUM CHLORIDE 600; 310; 30; 20 MG/100ML; MG/100ML; MG/100ML; MG/100ML
INJECTION, SOLUTION INTRAVENOUS CONTINUOUS
Status: DISCONTINUED | OUTPATIENT
Start: 2023-01-01 | End: 2023-01-01

## 2023-01-01 RX ORDER — NICOTINE 21 MG/24HR
1 PATCH, TRANSDERMAL 24 HOURS TRANSDERMAL EVERY 24 HOURS
COMMUNITY

## 2023-01-01 RX ORDER — LEVETIRACETAM 100 MG/ML
1000 SOLUTION ORAL 2 TIMES DAILY
Status: DISCONTINUED | OUTPATIENT
Start: 2023-01-01 | End: 2023-01-01 | Stop reason: HOSPADM

## 2023-01-01 RX ORDER — PANTOPRAZOLE SODIUM 40 MG/1
40 TABLET, DELAYED RELEASE ORAL DAILY
Status: DISCONTINUED | OUTPATIENT
Start: 2023-01-01 | End: 2023-01-01 | Stop reason: HOSPADM

## 2023-01-01 RX ORDER — ONDANSETRON 4 MG/1
4 TABLET, ORALLY DISINTEGRATING ORAL EVERY 6 HOURS PRN
Status: DISCONTINUED | OUTPATIENT
Start: 2023-01-01 | End: 2023-01-01 | Stop reason: HOSPADM

## 2023-01-01 RX ORDER — METOPROLOL TARTRATE 25 MG/1
25 TABLET, FILM COATED ORAL 2 TIMES DAILY
Status: DISCONTINUED | OUTPATIENT
Start: 2023-01-01 | End: 2023-01-01 | Stop reason: HOSPADM

## 2023-01-01 RX ORDER — HYDRALAZINE HYDROCHLORIDE 20 MG/ML
10 INJECTION INTRAMUSCULAR; INTRAVENOUS EVERY 4 HOURS PRN
Status: DISCONTINUED | OUTPATIENT
Start: 2023-01-01 | End: 2023-01-01 | Stop reason: HOSPADM

## 2023-01-01 RX ORDER — PROCHLORPERAZINE MALEATE 5 MG
5 TABLET ORAL EVERY 6 HOURS PRN
Status: DISCONTINUED | OUTPATIENT
Start: 2023-01-01 | End: 2023-01-01 | Stop reason: HOSPADM

## 2023-01-01 RX ORDER — ACETAMINOPHEN 650 MG/1
650 SUPPOSITORY RECTAL EVERY 4 HOURS PRN
Status: DISCONTINUED | OUTPATIENT
Start: 2023-01-01 | End: 2023-01-01 | Stop reason: HOSPADM

## 2023-01-01 RX ORDER — ONDANSETRON 2 MG/ML
4 INJECTION INTRAMUSCULAR; INTRAVENOUS EVERY 6 HOURS PRN
Status: DISCONTINUED | OUTPATIENT
Start: 2023-01-01 | End: 2023-01-01 | Stop reason: HOSPADM

## 2023-01-01 RX ORDER — ASCORBIC ACID 500 MG
500 TABLET ORAL DAILY
COMMUNITY

## 2023-01-01 RX ORDER — FERROUS SULFATE 325(65) MG
325 TABLET, DELAYED RELEASE (ENTERIC COATED) ORAL DAILY
COMMUNITY

## 2023-01-01 RX ORDER — PANTOPRAZOLE SODIUM 40 MG/1
40 TABLET, DELAYED RELEASE ORAL
Status: DISCONTINUED | OUTPATIENT
Start: 2023-01-01 | End: 2023-01-01 | Stop reason: HOSPADM

## 2023-01-01 RX ORDER — ACETAMINOPHEN 325 MG/1
650 TABLET ORAL EVERY 4 HOURS PRN
Status: DISCONTINUED | OUTPATIENT
Start: 2023-01-01 | End: 2023-01-01

## 2023-01-01 RX ORDER — METOPROLOL TARTRATE 25 MG/1
75 TABLET, FILM COATED ORAL 2 TIMES DAILY
COMMUNITY
Start: 2023-01-01

## 2023-01-01 RX ORDER — HYDRALAZINE HYDROCHLORIDE 10 MG/1
10 TABLET, FILM COATED ORAL EVERY 4 HOURS PRN
Status: DISCONTINUED | OUTPATIENT
Start: 2023-01-01 | End: 2023-01-01 | Stop reason: HOSPADM

## 2023-01-01 RX ORDER — ASPIRIN 81 MG/1
81 TABLET ORAL DAILY
Status: DISCONTINUED | OUTPATIENT
Start: 2023-01-01 | End: 2023-01-01

## 2023-01-01 RX ORDER — ACETAMINOPHEN 325 MG/1
650 TABLET ORAL EVERY 8 HOURS PRN
COMMUNITY

## 2023-01-01 RX ORDER — ACETAMINOPHEN 650 MG/1
650 SUPPOSITORY RECTAL EVERY 4 HOURS PRN
Start: 2023-01-01

## 2023-01-01 RX ORDER — ACETAMINOPHEN 325 MG/1
650 TABLET ORAL EVERY 4 HOURS PRN
Status: DISCONTINUED | OUTPATIENT
Start: 2023-01-01 | End: 2023-01-01 | Stop reason: HOSPADM

## 2023-01-01 RX ORDER — ACETAMINOPHEN 325 MG/1
650 TABLET ORAL EVERY 4 HOURS PRN
Start: 2023-01-01

## 2023-01-01 RX ORDER — PROCHLORPERAZINE 25 MG
12.5 SUPPOSITORY, RECTAL RECTAL EVERY 12 HOURS PRN
Status: DISCONTINUED | OUTPATIENT
Start: 2023-01-01 | End: 2023-01-01 | Stop reason: HOSPADM

## 2023-01-01 RX ORDER — CLOPIDOGREL BISULFATE 75 MG/1
75 TABLET ORAL DAILY
Status: DISCONTINUED | OUTPATIENT
Start: 2023-01-01 | End: 2023-01-01

## 2023-01-01 RX ORDER — IOPAMIDOL 755 MG/ML
500 INJECTION, SOLUTION INTRAVASCULAR ONCE
Status: COMPLETED | OUTPATIENT
Start: 2023-01-01 | End: 2023-01-01

## 2023-01-01 RX ORDER — AMOXICILLIN 250 MG
2 CAPSULE ORAL 2 TIMES DAILY PRN
Status: DISCONTINUED | OUTPATIENT
Start: 2023-01-01 | End: 2023-01-01 | Stop reason: HOSPADM

## 2023-01-01 RX ORDER — LISINOPRIL 2.5 MG/1
5 TABLET ORAL DAILY
Status: DISCONTINUED | OUTPATIENT
Start: 2023-01-01 | End: 2023-01-01 | Stop reason: HOSPADM

## 2023-01-01 RX ORDER — POLYETHYLENE GLYCOL 3350 17 G/17G
17 POWDER, FOR SOLUTION ORAL 2 TIMES DAILY PRN
Status: DISCONTINUED | OUTPATIENT
Start: 2023-01-01 | End: 2023-01-01 | Stop reason: HOSPADM

## 2023-01-01 RX ORDER — CEFTRIAXONE 1 G/1
1 INJECTION, POWDER, FOR SOLUTION INTRAMUSCULAR; INTRAVENOUS EVERY 24 HOURS
Status: DISCONTINUED | OUTPATIENT
Start: 2023-01-01 | End: 2023-01-01 | Stop reason: HOSPADM

## 2023-01-01 RX ORDER — CLOPIDOGREL BISULFATE 75 MG/1
75 TABLET ORAL DAILY
COMMUNITY
Start: 2023-01-01

## 2023-01-01 RX ORDER — POTASSIUM CHLORIDE 1500 MG/1
40 TABLET, EXTENDED RELEASE ORAL ONCE
Status: COMPLETED | OUTPATIENT
Start: 2023-01-01 | End: 2023-01-01

## 2023-01-01 RX ADMIN — LEVETIRACETAM 1000 MG: 100 SOLUTION ORAL at 20:39

## 2023-01-01 RX ADMIN — ASPIRIN 81 MG: 81 TABLET, COATED ORAL at 09:23

## 2023-01-01 RX ADMIN — CEPHALEXIN 500 MG: 250 FOR SUSPENSION ORAL at 21:40

## 2023-01-01 RX ADMIN — METOPROLOL TARTRATE 75 MG: 50 TABLET, FILM COATED ORAL at 21:23

## 2023-01-01 RX ADMIN — PANTOPRAZOLE SODIUM 40 MG: 40 TABLET, DELAYED RELEASE ORAL at 06:10

## 2023-01-01 RX ADMIN — Medication 1 PATCH: at 19:04

## 2023-01-01 RX ADMIN — PANTOPRAZOLE SODIUM 40 MG: 40 TABLET, DELAYED RELEASE ORAL at 09:33

## 2023-01-01 RX ADMIN — ACETAMINOPHEN 650 MG: 325 TABLET, FILM COATED ORAL at 12:59

## 2023-01-01 RX ADMIN — LEVETIRACETAM 1000 MG: 100 SOLUTION ORAL at 21:54

## 2023-01-01 RX ADMIN — ENOXAPARIN SODIUM 40 MG: 40 INJECTION SUBCUTANEOUS at 09:23

## 2023-01-01 RX ADMIN — IOPAMIDOL 70 ML: 755 INJECTION, SOLUTION INTRAVENOUS at 14:14

## 2023-01-01 RX ADMIN — LEVETIRACETAM 1000 MG: 100 SOLUTION ORAL at 08:12

## 2023-01-01 RX ADMIN — LEVETIRACETAM 1000 MG: 100 SOLUTION ORAL at 09:34

## 2023-01-01 RX ADMIN — LISINOPRIL 5 MG: 2.5 TABLET ORAL at 09:33

## 2023-01-01 RX ADMIN — POTASSIUM CHLORIDE 40 MEQ: 1500 TABLET, EXTENDED RELEASE ORAL at 19:43

## 2023-01-01 RX ADMIN — NICOTINE 1 PATCH: 21 PATCH, EXTENDED RELEASE TRANSDERMAL at 01:57

## 2023-01-01 RX ADMIN — LEVETIRACETAM 1000 MG: 100 SOLUTION ORAL at 09:33

## 2023-01-01 RX ADMIN — ASPIRIN 81 MG: 81 TABLET ORAL at 08:12

## 2023-01-01 RX ADMIN — CLOPIDOGREL BISULFATE 75 MG: 75 TABLET ORAL at 12:57

## 2023-01-01 RX ADMIN — IOPAMIDOL 70 ML: 755 INJECTION, SOLUTION INTRAVENOUS at 15:13

## 2023-01-01 RX ADMIN — ACETAMINOPHEN 650 MG: 325 TABLET, FILM COATED ORAL at 20:10

## 2023-01-01 RX ADMIN — POLYETHYLENE GLYCOL 3350 17 G: 17 POWDER, FOR SOLUTION ORAL at 18:28

## 2023-01-01 RX ADMIN — NICOTINE 1 PATCH: 21 PATCH, EXTENDED RELEASE TRANSDERMAL at 01:28

## 2023-01-01 RX ADMIN — NICOTINE 1 PATCH: 21 PATCH, EXTENDED RELEASE TRANSDERMAL at 02:06

## 2023-01-01 RX ADMIN — ENOXAPARIN SODIUM 40 MG: 40 INJECTION SUBCUTANEOUS at 11:08

## 2023-01-01 RX ADMIN — CEFTRIAXONE SODIUM 1 G: 1 INJECTION, POWDER, FOR SOLUTION INTRAMUSCULAR; INTRAVENOUS at 21:21

## 2023-01-01 RX ADMIN — ACETAMINOPHEN 650 MG: 325 TABLET, FILM COATED ORAL at 08:49

## 2023-01-01 RX ADMIN — CEPHALEXIN 500 MG: 250 FOR SUSPENSION ORAL at 17:51

## 2023-01-01 RX ADMIN — SODIUM CHLORIDE 100 ML: 9 INJECTION, SOLUTION INTRAVENOUS at 15:13

## 2023-01-01 RX ADMIN — Medication 1 PATCH: at 08:12

## 2023-01-01 RX ADMIN — ACETAMINOPHEN 650 MG: 650 SUPPOSITORY RECTAL at 00:11

## 2023-01-01 RX ADMIN — CEFTRIAXONE SODIUM 1 G: 1 INJECTION, POWDER, FOR SOLUTION INTRAMUSCULAR; INTRAVENOUS at 19:47

## 2023-01-01 RX ADMIN — LEVETIRACETAM 1000 MG: 10 INJECTION INTRAVENOUS at 21:23

## 2023-01-01 RX ADMIN — Medication 1 PATCH: at 09:33

## 2023-01-01 RX ADMIN — CLOPIDOGREL BISULFATE 75 MG: 75 TABLET ORAL at 08:12

## 2023-01-01 RX ADMIN — ACETAMINOPHEN 650 MG: 325 TABLET, FILM COATED ORAL at 09:22

## 2023-01-01 RX ADMIN — PANTOPRAZOLE SODIUM 40 MG: 40 INJECTION, POWDER, FOR SOLUTION INTRAVENOUS at 11:08

## 2023-01-01 RX ADMIN — LEVETIRACETAM 1000 MG: 100 SOLUTION ORAL at 09:41

## 2023-01-01 RX ADMIN — ENOXAPARIN SODIUM 40 MG: 40 INJECTION SUBCUTANEOUS at 09:39

## 2023-01-01 RX ADMIN — SODIUM CHLORIDE 100 ML: 9 INJECTION, SOLUTION INTRAVENOUS at 14:14

## 2023-01-01 RX ADMIN — METOPROLOL TARTRATE 25 MG: 25 TABLET, FILM COATED ORAL at 20:11

## 2023-01-01 RX ADMIN — METOPROLOL TARTRATE 75 MG: 50 TABLET, FILM COATED ORAL at 09:23

## 2023-01-01 RX ADMIN — METOPROLOL TARTRATE 75 MG: 50 TABLET, FILM COATED ORAL at 09:33

## 2023-01-01 RX ADMIN — ASPIRIN 81 MG: 81 TABLET, COATED ORAL at 09:32

## 2023-01-01 RX ADMIN — SODIUM CHLORIDE 500 ML: 9 INJECTION, SOLUTION INTRAVENOUS at 18:20

## 2023-01-01 RX ADMIN — LISINOPRIL 5 MG: 2.5 TABLET ORAL at 09:22

## 2023-01-01 RX ADMIN — CLOPIDOGREL BISULFATE 75 MG: 75 TABLET ORAL at 09:23

## 2023-01-01 RX ADMIN — CEFTRIAXONE SODIUM 1 G: 1 INJECTION, POWDER, FOR SOLUTION INTRAMUSCULAR; INTRAVENOUS at 20:57

## 2023-01-01 RX ADMIN — SODIUM CHLORIDE, POTASSIUM CHLORIDE, SODIUM LACTATE AND CALCIUM CHLORIDE: 600; 310; 30; 20 INJECTION, SOLUTION INTRAVENOUS at 22:35

## 2023-01-01 RX ADMIN — METOPROLOL TARTRATE 75 MG: 50 TABLET, FILM COATED ORAL at 20:28

## 2023-01-01 RX ADMIN — PANTOPRAZOLE SODIUM 40 MG: 40 TABLET, DELAYED RELEASE ORAL at 09:23

## 2023-01-01 RX ADMIN — LISINOPRIL 5 MG: 2.5 TABLET ORAL at 12:59

## 2023-01-01 RX ADMIN — HUMAN ALBUMIN MICROSPHERES AND PERFLUTREN 6 ML: 10; .22 INJECTION, SOLUTION INTRAVENOUS at 12:32

## 2023-01-01 RX ADMIN — LEVETIRACETAM 1000 MG: 10 INJECTION INTRAVENOUS at 11:08

## 2023-01-01 RX ADMIN — ASPIRIN 81 MG: 81 TABLET, COATED ORAL at 12:58

## 2023-01-01 RX ADMIN — CLOPIDOGREL BISULFATE 75 MG: 75 TABLET ORAL at 09:33

## 2023-01-01 RX ADMIN — PANTOPRAZOLE SODIUM 40 MG: 40 TABLET, DELAYED RELEASE ORAL at 06:39

## 2023-01-01 RX ADMIN — METOPROLOL TARTRATE 75 MG: 50 TABLET, FILM COATED ORAL at 12:57

## 2023-01-01 RX ADMIN — LEVETIRACETAM 1000 MG: 10 INJECTION INTRAVENOUS at 22:43

## 2023-01-01 RX ADMIN — CEPHALEXIN 500 MG: 250 FOR SUSPENSION ORAL at 09:43

## 2023-01-01 ASSESSMENT — ENCOUNTER SYMPTOMS: HEADACHES: 1

## 2023-01-01 ASSESSMENT — ACTIVITIES OF DAILY LIVING (ADL)
HEARING_DIFFICULTY_OR_DEAF: NO
ADLS_ACUITY_SCORE: 31
ADLS_ACUITY_SCORE: 35
DIFFICULTY_COMMUNICATING: NO
ADLS_ACUITY_SCORE: 35
CONCENTRATING,_REMEMBERING_OR_MAKING_DECISIONS_DIFFICULTY: YES
ADLS_ACUITY_SCORE: 31
ADLS_ACUITY_SCORE: 31
ADLS_ACUITY_SCORE: 38
ADLS_ACUITY_SCORE: 57
ADLS_ACUITY_SCORE: 38
ADLS_ACUITY_SCORE: 38
ADLS_ACUITY_SCORE: 62
ADLS_ACUITY_SCORE: 57
ADLS_ACUITY_SCORE: 47
ADLS_ACUITY_SCORE: 57
TOILETING: 2-->COMPLETELY DEPENDENT (NOT DEVELOPMENTALLY APPROPRIATE)
ADLS_ACUITY_SCORE: 38
ADLS_ACUITY_SCORE: 58
ADLS_ACUITY_SCORE: 57
ADLS_ACUITY_SCORE: 57
EQUIPMENT_CURRENTLY_USED_AT_HOME: WALKER, STANDARD
ADLS_ACUITY_SCORE: 57
ADLS_ACUITY_SCORE: 57
WALKING_OR_CLIMBING_STAIRS_DIFFICULTY: YES
ADLS_ACUITY_SCORE: 57
ADLS_ACUITY_SCORE: 62
DIFFICULTY_EATING/SWALLOWING: NO
ADLS_ACUITY_SCORE: 38
ADLS_ACUITY_SCORE: 59
ADLS_ACUITY_SCORE: 62
ADLS_ACUITY_SCORE: 35
ADLS_ACUITY_SCORE: 57
ADLS_ACUITY_SCORE: 62
ADLS_ACUITY_SCORE: 42
ADLS_ACUITY_SCORE: 38
ADLS_ACUITY_SCORE: 61
CHANGE_IN_FUNCTIONAL_STATUS_SINCE_ONSET_OF_CURRENT_ILLNESS/INJURY: YES
ADLS_ACUITY_SCORE: 47
ADLS_ACUITY_SCORE: 31
ADLS_ACUITY_SCORE: 35
ADLS_ACUITY_SCORE: 62
ADLS_ACUITY_SCORE: 42
ADLS_ACUITY_SCORE: 35
ADLS_ACUITY_SCORE: 61
DRESSING/BATHING_DIFFICULTY: YES
ADLS_ACUITY_SCORE: 57
ADLS_ACUITY_SCORE: 38
ADLS_ACUITY_SCORE: 35
ADLS_ACUITY_SCORE: 38
WEAR_GLASSES_OR_BLIND: YES
ADLS_ACUITY_SCORE: 59
ADLS_ACUITY_SCORE: 38
TOILETING: 2-->COMPLETELY DEPENDENT
DEPENDENT_IADLS:: CLEANING;COOKING;LAUNDRY;SHOPPING;MEAL PREPARATION;MEDICATION MANAGEMENT;MONEY MANAGEMENT;TRANSPORTATION;INCONTINENCE
ADLS_ACUITY_SCORE: 31
TOILETING_ISSUES: YES
ADLS_ACUITY_SCORE: 38
ADLS_ACUITY_SCORE: 38
ADLS_ACUITY_SCORE: 42
ADLS_ACUITY_SCORE: 59
ADLS_ACUITY_SCORE: 62
ADLS_ACUITY_SCORE: 62
ADLS_ACUITY_SCORE: 35
ADLS_ACUITY_SCORE: 31
ADLS_ACUITY_SCORE: 31
ADLS_ACUITY_SCORE: 59
ADLS_ACUITY_SCORE: 59
ADLS_ACUITY_SCORE: 35
ADLS_ACUITY_SCORE: 62
DOING_ERRANDS_INDEPENDENTLY_DIFFICULTY: YES
ADLS_ACUITY_SCORE: 62
ADLS_ACUITY_SCORE: 42
ADLS_ACUITY_SCORE: 62
ADLS_ACUITY_SCORE: 35
ADLS_ACUITY_SCORE: 59
FALL_HISTORY_WITHIN_LAST_SIX_MONTHS: NO
ADLS_ACUITY_SCORE: 62
ADLS_ACUITY_SCORE: 58
WALKING_OR_CLIMBING_STAIRS: AMBULATION DIFFICULTY, REQUIRES EQUIPMENT
ADLS_ACUITY_SCORE: 57
VISION_MANAGEMENT: READING GLASSES
ADLS_ACUITY_SCORE: 42
ADLS_ACUITY_SCORE: 57

## 2023-09-05 NOTE — ED PROVIDER NOTES
"  History     Chief Complaint   Patient presents with    Altered Mental Status     Headache & difficulty swallowing.     HPI  Soila Meade is a 68 year old female who presents from care facility for headache and intermittent difficulty with swallowing yesterday.  Patient has a history of chronic sciatica, chronic pain, depression, intracranial hemorrhage,  shunt, hypertension, reflux.  History consist of patient having difficulty swallowing intermittently overnight with wake-up complaints of headache.  No neurological deficits noted that her off baseline from previous.  Daughter at bedside notes that they are in contact with patient's neurosurgery and has an appointment tomorrow for reevaluation of her  shunt.  As this was recently adjusted.  Patient's not had any fevers nausea vomiting abdominal pain chest pain shortness of breath or cough.  She has no rashes or joint abnormalities consistent with any falls.    Allergies:  No Known Allergies    Problem List:    There are no problems to display for this patient.       Past Medical History:    No past medical history on file.    Past Surgical History:    No past surgical history on file.    Family History:    No family history on file.    Social History:  Marital Status:  Unknown [6]        Medications:    No current outpatient medications on file.        Review of Systems   Neurological:  Positive for headaches.   All other systems reviewed and are negative.      Physical Exam   BP: 139/79  Pulse: 89  Temp: 99  F (37.2  C)  Resp: 20  Height: 167.6 cm (5' 6\")  Weight: 63 kg (139 lb)  SpO2: 99 %      Physical Exam  Vitals and nursing note reviewed.   Constitutional:       General: She is not in acute distress.     Appearance: She is not ill-appearing or toxic-appearing.   HENT:      Head: Normocephalic and atraumatic.      Comments:  shunt on right side  Eyes:      Extraocular Movements: Extraocular movements intact.      Pupils: Pupils are equal, round, and " reactive to light.   Cardiovascular:      Rate and Rhythm: Normal rate.   Pulmonary:      Effort: Pulmonary effort is normal. No respiratory distress.      Breath sounds: Normal breath sounds. No stridor. No wheezing.   Abdominal:      General: Bowel sounds are normal. There is no distension.      Palpations: Abdomen is soft.      Tenderness: There is no abdominal tenderness.   Musculoskeletal:         General: Normal range of motion.   Skin:     General: Skin is warm and dry.      Capillary Refill: Capillary refill takes 2 to 3 seconds.   Neurological:      Mental Status: She is alert. Mental status is at baseline.      GCS: GCS eye subscore is 4. GCS verbal subscore is 5. GCS motor subscore is 6.      Cranial Nerves: No cranial nerve deficit.      Sensory: No sensory deficit.      Deep Tendon Reflexes: Reflexes normal.   Psychiatric:         Speech: Speech normal.         Behavior: Behavior normal.         ED Course              Assessments & Plan (with Medical Decision Making)     I have reviewed the nursing notes.    I have reviewed the findings, diagnosis, plan and need for follow up with the patient.          Medical Decision Making  The patient's presentation was of moderate complexity ( ).    68-year-old female presenting with some complex history with headache.  Patient recently had intracranial hemorrhage and has a history of brain aneurysms and currently has a  shunt in place.  Patient is not had any fevers nausea or confusion consistent with any type of intracranial infection.  Vitals are stable on arrival.  Patient did have a recent intracranial hemorrhage and  shunt with alterations to help alleviate the intracranial hemorrhage.  Will evaluate for electrolyte disturbances versus ACS versus acute intracranial bleed versus stroke.  Troponin 1 3, lipase 2 6, magnesium 1.6 and CMP and CBC otherwise unremarkable.  Lactic acid one-point no unlikely associated sepsis.  No acute findings on physical exam  for new concerns of falls or infections/cellulitis.  No chest pains unlikely ACS.  Discussed case with on-call physician neurosurgeon  who is currently also managing patient's  shunt.  He notes that the image findings and lab work as well as symptoms are consistent with goal of increasing intracranial pressure to resolve the intracranial hemorrhage.  Discussed he will follow-up with patient tomorrow and decrease some pressure.  Discussed findings with patient's family that are reassured and feel comfortable with discharge plan.  Supportive care measures discussed and discharged home with return precautions.      New Prescriptions    No medications on file       Final diagnoses:   Nonintractable headache, unspecified chronicity pattern, unspecified headache type   S/P  shunt   Hx of intracranial hemorrhage       9/5/2023   [facility] EMERGENCY DEPT       Bri Kearns MD  09/05/23 8638

## 2023-09-05 NOTE — DISCHARGE INSTRUCTIONS
You were seen today for headache.  He did have a mild increase in your pressure created by your  shunt which was evident on your CT which also showed resolution of intracranial hemorrhage.  Your lab work was otherwise uncomplicated and unremarkable.  We spoke to neurosurgery and noted that this is appropriate for your symptoms as the attempt of increased  pressure to resolve your intracranial hemorrhage would result in similar symptoms.  Given appointment tomorrow which we highly recommend you keep an area able to attend otherwise Tylenol for headache is appropriate.

## 2023-09-05 NOTE — ED TRIAGE NOTES
PT brought via EMS from [place] home facility, HX of brain aneurysm on June, last night developed difficulty swallowing,confusion and woke up today with a headache.

## 2023-11-05 PROBLEM — G89.29 CHRONIC MIDLINE LOW BACK PAIN WITHOUT SCIATICA: Status: ACTIVE | Noted: 2018-03-06

## 2023-11-05 PROBLEM — Z87.891 FORMER SMOKER: Status: ACTIVE | Noted: 2020-01-20

## 2023-11-05 PROBLEM — R47.01 APHASIA: Status: ACTIVE | Noted: 2023-01-01

## 2023-11-05 PROBLEM — M54.50 CHRONIC MIDLINE LOW BACK PAIN WITHOUT SCIATICA: Status: ACTIVE | Noted: 2018-03-06

## 2023-11-05 PROBLEM — M85.80 OSTEOPENIA: Status: ACTIVE | Noted: 2017-01-24

## 2023-11-05 NOTE — MEDICATION SCRIBE - ADMISSION MEDICATION HISTORY
Medication Scribe Admission Medication History    Admission medication history is complete. The information provided in this note is only as accurate as the sources available at the time of the update.    Information Source(s): Facility (U/NH/) medication list/MAR and CareEverywhere/SureScripts via N/A    Pertinent Information: [facility] spoke with Hilary PRAKASH who faxed the MAR    Changes made to PTA medication list:  Added: ALL  Deleted: None  Changed: None    Medication Affordability:  Not including over the counter (OTC) medications, was there a time in the past 3 months when you did not take your medications as prescribed because of cost?: Unable to Assess    Allergies reviewed with patient and updates made in EHR: yes    Medication History Completed By: HERMILO JAMES 11/5/2023 4:33 PM    PTA Med List   Medication Sig Last Dose    acetaminophen (TYLENOL) 325 MG tablet Take 650 mg by mouth every 8 hours as needed for pain 11/5/2023 at 1400    aspirin 81 MG EC tablet Take 81 mg by mouth daily 11/5/2023 at 0800    clopidogrel (PLAVIX) 75 MG tablet Take 75 mg by mouth daily Cerebral aneurysm, nonruptured 11/5/2023 at 0800    ferrous sulfate (FE TABS) 325 (65 Fe) MG EC tablet Take 325 mg by mouth daily 11/5/2023 at 0800    levETIRAcetam (KEPPRA) 100 MG/ML oral solution Take 10 mLs by mouth 2 times daily Cerebral aneurysm, nonruptured 11/5/2023 at 0800    lisinopril (ZESTRIL) 5 MG tablet Take 5 mg by mouth daily HOLD for systolic BP <110 11/5/2023 at 0800    melatonin 5 MG tablet Take 5 mg by mouth at bedtime 11/4/2023 at hs    metoprolol tartrate (LOPRESSOR) 25 MG tablet Take 25 mg by mouth 2 times daily 11/5/2023 at 0800    nicotine (NICODERM CQ) 14 MG/24HR 24 hr patch Place 1 patch onto the skin every 24 hours 11/5/2023 at 0800 rt arm    pantoprazole (PROTONIX) 40 MG EC tablet Take 40 mg by mouth daily 11/5/2023 at 0800    sennosides (SENOKOT) 8.6 MG tablet Take 1 tablet by mouth  2 times daily as needed for constipation 11/5/2023 at 0800    vitamin C (ASCORBIC ACID) 500 MG tablet Take 500 mg by mouth daily 11/5/2023 at 0800

## 2023-11-05 NOTE — ED TRIAGE NOTES
Patient arrives via EMS with new L sided facial droop, family reports patient is not verbal today. Hx of subdural bleed. Last known well yesterday around 1400. Code stroke called. .     Triage Assessment (Adult)       Row Name 11/05/23 145          Triage Assessment    Airway WDL WDL        Respiratory WDL    Respiratory WDL WDL        Skin Circulation/Temperature WDL    Skin Circulation/Temperature WDL WDL

## 2023-11-05 NOTE — CONSULTS
"  [facility]    Stroke Telephone Note    I was called by Corey Arriola on 11/05/23 regarding patient Soila Meade. The patient is a 68 year old female with history of HTN, PATRICK aneurysm & intracranial hemorrhage s/p clipping who presented with speech difficulties. She lives in a nursing home and cannot walk at baseline. Last known well time sometime yesterday. Today, her family noticed her to be aphasic with left facial droop. Exam limited due to altered mental status.     BP (!) 142/87   Pulse 90   Temp 98.7  F (37.1  C) (Temporal)   Resp 18   Wt 64.4 kg (142 lb)   SpO2 95%   BMI 22.92 kg/m       Stroke Code Data (for stroke code without tele)  Stroke code activated 11/05/23   1500   Stroke provider first response  11/05/23   1501            Last known normal 11/04/23        Unknown (\"evening\")   Time of discovery   (or onset of symptoms) 11/05/23    (unknown)   Head CT read by Stroke Neuro Dr/Provider 11/05/23   1520   Was stroke code de-escalated? Yes 11/05/23 1539          Imaging Findings  CT head: No acute findings. Right frontal gliosis and encephalomalacia  CTA head/neck: No LVO or flow limiting stenosis    Intravenous Thrombolysis  Not given due to:   - unclear or unfavorable risk-benefit profile for extended window thrombolysis beyond the conventional 4.5 hour time window    Endovascular Treatment  Not initiated due to absence of proximal vessel occlusion    Impression  Aphasia vs encephalopathy    Recommendations   Admit for observation overnight  MRI Brain tomorrow morning if possible or repeat CT Head to look for evolving ischemic changes  Toxic metabolic workup per primary    My recommendations are based on the information provided over the phone by Soila Meade's in-person providers. They are not intended to replace the clinical judgment of her in-person providers. I was not requested to personally see or examine the patient at this time.    Harish " "MD Seth       Department of Neurology       Securely message with the [employer] Web Console (learn more here)   To page me or covering stroke neurology team member, click here: [email or web address]  Choose \"On Call\" tab at top, then select \"NEUROLOGY/ALL SITES\" from middle drop-down box, press Enter, then look for \"stroke\" or \"telestroke\" for your site.        "

## 2023-11-05 NOTE — H&P
Admission History and Physical   Soila Meade, 1955, [id number]  [facility]  No admission diagnoses are documented for this encounter.  PCP:No Ref-Primary, Physician, None   Admitting provider: Katalina Marr MD.    Code status:  Full Code          Extended Emergency Contact Information  Primary Emergency Contact: Nakita VIDALES  Mobile Phone: [phone]  Relation: Daughter       Assessment and Plan  Principal Problem:    Aphasia  Active Problems:    Essential hypertension    Former smoker    Soila Meade is a 68 year old female presenting with HTN, PATRICK aneurysm & intracranial hemorrhage s/p clipping who presented with speech difficulties. She lives in a nursing home and cannot walk at baseline     CVA with aphasia with previous h/o ICH and aneurysm clipping in march then again in June with shunt placement.  We do not have these records yet these were performed at [facility]. Per family her speech is fluent and she feeds herself, but does not ambulate   - obtain medical records from [facility], uncertain of types of clips and shunt in order to perform MRI if able Vs repeat head CT. Will need to be ordered tomorrow once information obtained   - Neuro tele seen will consult them to see again in morning  - stroke orders placed  - no bleed on current CT ordered asa and plavix for tomorrow and held until more information obtained, did receive already today    - hold BP meds for now   - continue Keppra and will check level   - Urine dirty possible UTI will treat for now since no acute CVA found   - SLP to see  - hold on PT OT since gets skilled care and non ambulatory and his dressed.    HTN  - allow permissive for now unless neuro recs otherwise, no bleed seen on CT     Smoker  - currently on 14 mg/24 hr    COVID-19 PCR Results          1/22/2023    10:42   COVID-19 PCR Results   COVID-19 Virus by PCR (External Result) Negative          Details          This result is from an external source.             COVID-19  Antibody Results, Testing for Immunity           No data to display              VTE prophylaxis:  Pneumatic Compression Devices  DIET: Orders Placed This Encounter      Advance Diet as Tolerated: Clear Liquid Diet    Drains/Lines: none  Weight bearing status: NWB patient does not ambulate   Disposition/Barriers to discharge: pending repeat imaging   Code Status:Full Code    HPI: Soila Meade is a 68 year old female with h/o rom the nursing home secondary to stroke symptoms.  Last known well time was yesterday.  She has a history of brain hemorrhage, cerebral aneurysms with clipping and still has some aneurysms that have not been clipped.  She is in a nursing home.  At baseline she cannot walk due to bilateral lower extremity weakness.  She normally can speak and is conversive.  She did complain of a headache today.  She has bilateral upper extremity weakness but it seems to be more on the left side.  It was noted that she had a left facial droop.     Past Medical History:   Diagnosis Date    Chronic midline low back pain without sciatica 03/06/2018    Degeneration of cervical intervertebral disc 10/15/2013    Essential hypertension 06/06/2016    Former smoker 01/20/2020    Formatting of this note might be different from the original. Quit 2018. 1/2 ppd x 10 yr.    Insomnia 05/29/2012    Osteopenia 01/24/2017    Personal history of colonic polyps 02/05/2013     History reviewed. No pertinent surgical history.  History reviewed. No pertinent family history.  Social History     Socioeconomic History    Marital status: Unknown     Spouse name: Not on file    Number of children: Not on file    Years of education: Not on file    Highest education level: Not on file   Occupational History    Not on file   Tobacco Use    Smoking status: Not on file    Smokeless tobacco: Not on file   Substance and Sexual Activity    Alcohol use: Not on file    Drug use: Not on file    Sexual activity: Not on file   Other Topics Concern     Not on file   Social History Narrative    Not on file     Social Determinants of Health     Financial Resource Strain: Not on file   Food Insecurity: Not on file   Transportation Needs: Not on file   Physical Activity: Not on file   Stress: Not on file   Social Connections: Not on file   Interpersonal Safety: Not on file   Housing Stability: Not on file     No Known Allergies    PRIOR TO ADMISSION MEDICATIONS   Prior to Admission medications    Medication Sig Last Dose Taking? Auth Provider Long Term End Date   acetaminophen (TYLENOL) 325 MG tablet Take 650 mg by mouth every 8 hours as needed for pain 11/5/2023 at 1400 Yes Reported, Patient     aspirin 81 MG EC tablet Take 81 mg by mouth daily 11/5/2023 at 0800 Yes Reported, Patient     clopidogrel (PLAVIX) 75 MG tablet Take 75 mg by mouth daily Cerebral aneurysm, nonruptured 11/5/2023 at 0800 Yes Reported, Patient Yes    ferrous sulfate (FE TABS) 325 (65 Fe) MG EC tablet Take 325 mg by mouth daily 11/5/2023 at 0800 Yes Reported, Patient     levETIRAcetam (KEPPRA) 100 MG/ML oral solution Take 10 mLs by mouth 2 times daily Cerebral aneurysm, nonruptured 11/5/2023 at 0800 Yes Reported, Patient Yes    lisinopril (ZESTRIL) 5 MG tablet Take 5 mg by mouth daily HOLD for systolic BP <110 11/5/2023 at 0800 Yes Reported, Patient Yes    melatonin 5 MG tablet Take 5 mg by mouth at bedtime 11/4/2023 at hs Yes Reported, Patient     metoprolol tartrate (LOPRESSOR) 25 MG tablet Take 25 mg by mouth 2 times daily 11/5/2023 at 0800 Yes Reported, Patient Yes    nicotine (NICODERM CQ) 14 MG/24HR 24 hr patch Place 1 patch onto the skin every 24 hours 11/5/2023 at 0800 rt arm Yes Reported, Patient     pantoprazole (PROTONIX) 40 MG EC tablet Take 40 mg by mouth daily 11/5/2023 at 0800 Yes Reported, Patient     sennosides (SENOKOT) 8.6 MG tablet Take 1 tablet by mouth 2 times daily as needed for constipation 11/5/2023 at 0800 Yes Reported, Patient     vitamin C (ASCORBIC ACID) 500 MG  tablet Take 500 mg by mouth daily 11/5/2023 at 0800 Yes Reported, Patient          REVIEW OF SYSTEMS:  12 point reviewed pertinent negatives and positives in HPI all others negative     PHYSICAL EXAM  Temp:  [98.7  F (37.1  C)-99.2  F (37.3  C)] 99.2  F (37.3  C)  Pulse:  [84-93] 93  Resp:  [12-25] 17  BP: (128-144)/(75-92) 143/89  SpO2:  [95 %-97 %] 95 %  Wt Readings from Last 1 Encounters:   11/05/23 60.7 kg (133 lb 13.1 oz)     Body mass index is 21.6 kg/m .  Physical Exam  Constitutional:       Comments: NAD, chronically ill-appearing, flat affect   HENT:      Head: Normocephalic.      Comments: Surgical scar     Nose: Nose normal.      Mouth/Throat:      Mouth: Mucous membranes are moist.      Pharynx: Oropharynx is clear.   Eyes:      Extraocular Movements: Extraocular movements intact.      Conjunctiva/sclera: Conjunctivae normal.      Pupils: Pupils are equal, round, and reactive to light.   Neck:      Vascular: No carotid bruit.   Cardiovascular:      Rate and Rhythm: Normal rate and regular rhythm.      Pulses: Normal pulses.      Heart sounds: Normal heart sounds.   Pulmonary:      Effort: Pulmonary effort is normal. No respiratory distress.      Breath sounds: Normal breath sounds. No wheezing or rales.   Abdominal:      General: Bowel sounds are normal. There is no distension.      Palpations: Abdomen is soft.      Tenderness: There is no abdominal tenderness. There is no guarding.   Musculoskeletal:         General: Normal range of motion.      Cervical back: Normal range of motion and neck supple. No tenderness.      Right lower leg: No edema.      Left lower leg: No edema.   Skin:     General: Skin is warm and dry.      Capillary Refill: Capillary refill takes less than 2 seconds.   Neurological:      Mental Status: She is alert.      Sensory: Sensation is intact.      Deep Tendon Reflexes: Reflexes abnormal.      Comments: Word finding difficulty, slow to respond, can answer yes or now to if she's  hungry but thinks she's at [place] and upon further questioning does not answer  left facial droop mild  Difficult to assess arm drift  Weak grasp  Wiggles toes, unable to lift legs does not follow instructions on distal motor exam.          PERTINENT LABS and RADIOLOGY   Results for orders placed or performed during the hospital encounter of 11/05/23   CT Head w/o Contrast    Impression    IMPRESSION:   HEAD CT:  1.  No acute intracranial hemorrhage, extra-axial fluid collection, or evidence of an acute transcortical infarct.  2.  Unchanged right frontal approach ventriculostomy catheter with slightly increased size of the supratentorial ventricles since 10/20/2023.  3.  Chronic changes, as described.    HEAD CTA:   1.  No large vessel occlusion or high-grade stenosis.  2.  Status post prior A3 segment left PATRICK stenting and aneurysm embolization without evidence of aneurysm recurrence.  3.  Unchanged 3 mm cavernous right ICA and MCA bifurcation aneurysms.    NECK CTA:  1.  No hemodynamically significant stenosis or dissection in the neck vessels.    Preliminary noncontrast head CT findings were discussed over the phone with Dr. Arriola on 11/05/2023 at 1520 CST.   CTA Head Neck with Contrast    Impression    IMPRESSION:   HEAD CT:  1.  No acute intracranial hemorrhage, extra-axial fluid collection, or evidence of an acute transcortical infarct.  2.  Unchanged right frontal approach ventriculostomy catheter with slightly increased size of the supratentorial ventricles since 10/20/2023.  3.  Chronic changes, as described.    HEAD CTA:   1.  No large vessel occlusion or high-grade stenosis.  2.  Status post prior A3 segment left PATRICK stenting and aneurysm embolization without evidence of aneurysm recurrence.  3.  Unchanged 3 mm cavernous right ICA and MCA bifurcation aneurysms.    NECK CTA:  1.  No hemodynamically significant stenosis or dissection in the neck vessels.    Preliminary noncontrast head CT findings were  discussed over the phone with Dr. Arriola on 11/05/2023 at 1520 CST.       Imaging results reviewed over the past 24 hrs:   Recent Results (from the past 24 hour(s))   CT Head w/o Contrast    Narrative    EXAM: CTA HEAD NECK W CONTRAST, CT HEAD W/O CONTRAST  LOCATION: [facility]  DATE: 11/5/2023    INDICATION: Code Stroke to evaluate for potential thrombolysis and thrombectomy. PLEASE READ IMMEDIATELY.  COMPARISON: Head CT dated 10/20/2023 and CTA dated 06/01/2023.   CONTRAST: 70mL, Isovue 370  TECHNIQUE: Head and neck CT angiogram with IV contrast. Noncontrast head CT followed by axial helical CT images of the head and neck vessels obtained during the arterial phase of intravenous contrast administration. Axial 2D reconstructed images and   multiplanar 3D MIP reconstructed images of the head and neck vessels were performed by the technologist. Dose reduction techniques were used. All stenosis measurements made according to NASCET criteria unless otherwise specified.    FINDINGS:   NONCONTRAST HEAD CT:   INTRACRANIAL CONTENTS: No acute intracranial hemorrhage, extraaxial collection, or midline shift. Redemonstration of a right frontal approach ventriculostomy catheter terminating at the left foramen of Monro. Changes related to prior left frontal   approach ventriculostomy catheter. When remeasured in a similar fashion, mildly increased size of the lateral and third ventricles with increased prominence of the temporal horns; for example, the maximum collective transverse dimension of the frontal   horns of the lateral ventricles measures 4.9 cm, previously 4.4 cm (series 2 image 13). Similar encephalomalacia involving the parasagittal/anterior frontal lobes, greater on the right. No evidence of an acute transcortical confluent infarct. Mild   presumed chronic small vessel ischemic changes. Mild generalized generalized cerebral parenchymal volume loss.    VISUALIZED  ORBITS/SINUSES/MASTOIDS: No acute intraorbital finding. No evidence of significant paranasal sinus or mastoid mucosal disease.     BONES/SOFT TISSUES: No acute abnormality.    HEAD CTA:  ANTERIOR CIRCULATION: No high-grade stenosis, occlusion, new aneurysm, or high-flow vascular malformation. Redemonstration of findings related to prior anterior cerebral artery stenting and A3 segment aneurysm embolization without evidence of residual   aneurysm. Unchanged 3 mm mm outpouching arising from the cavernous right ICA (series 6 image 399). Unchanged 3 mm outpouching arising from the right MCA bifurcation projecting posteriorly and slightly superiorly (series 6 image 424). Standard [place] of   Abel anatomy.    POSTERIOR CIRCULATION: No high-grade stenosis, occlusion, aneurysm, or high-flow vascular malformation. Balanced vertebral arteries.     DURAL VENOUS SINUSES: Not well evaluated on a technical basis.    NECK CTA:  RIGHT CAROTID: No hemodynamically significant stenosis or dissection. Similar mild mildly irregular predominantly noncalcified atherosclerotic plaque at the carotid bifurcation and proximal ICA.    LEFT CAROTID: No hemodynamically significant stenosis or dissection.    VERTEBRAL ARTERIES: Similar mild narrowing at the right vertebral artery origin. No focal high-grade stenosis or dissection. Balanced vertebral arteries.    AORTIC ARCH: Classic aortic arch anatomy with no significant stenosis at the origin of the great vessels. Similar mild narrowing involving the origin and proximal segment of the left subclavian artery due to predominantly noncalcified atherosclerotic   plaque.    NONVASCULAR STRUCTURES: Mild centrilobular pulmonary emphysema. 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 Multilevel spondylosis with moderate interbody degenerative change at C5-C7 and advanced left C4-C5 facet arthrosis. No severe spinal canal   stenosis. Moderate to severe multilevel foraminal narrowing.      Impression     IMPRESSION:   HEAD CT:  1.  No acute intracranial hemorrhage, extra-axial fluid collection, or evidence of an acute transcortical infarct.  2.  Unchanged right frontal approach ventriculostomy catheter with slightly increased size of the supratentorial ventricles since 10/20/2023.  3.  Chronic changes, as described.    HEAD CTA:   1.  No large vessel occlusion or high-grade stenosis.  2.  Status post prior A3 segment left PATRICK stenting and aneurysm embolization without evidence of aneurysm recurrence.  3.  Unchanged 3 mm cavernous right ICA and MCA bifurcation aneurysms.    NECK CTA:  1.  No hemodynamically significant stenosis or dissection in the neck vessels.    Preliminary noncontrast head CT findings were discussed over the phone with Dr. Arriola on 11/05/2023 at 1520 CST.   CTA Head Neck with Contrast    Narrative    EXAM: CTA HEAD NECK W CONTRAST, CT HEAD W/O CONTRAST  LOCATION: [facility]  DATE: 11/5/2023    INDICATION: Code Stroke to evaluate for potential thrombolysis and thrombectomy. PLEASE READ IMMEDIATELY.  COMPARISON: Head CT dated 10/20/2023 and CTA dated 06/01/2023.   CONTRAST: 70mL, Isovue 370  TECHNIQUE: Head and neck CT angiogram with IV contrast. Noncontrast head CT followed by axial helical CT images of the head and neck vessels obtained during the arterial phase of intravenous contrast administration. Axial 2D reconstructed images and   multiplanar 3D MIP reconstructed images of the head and neck vessels were performed by the technologist. Dose reduction techniques were used. All stenosis measurements made according to NASCET criteria unless otherwise specified.    FINDINGS:   NONCONTRAST HEAD CT:   INTRACRANIAL CONTENTS: No acute intracranial hemorrhage, extraaxial collection, or midline shift. Redemonstration of a right frontal approach ventriculostomy catheter terminating at the left foramen of Monro. Changes related to prior left frontal   approach  ventriculostomy catheter. When remeasured in a similar fashion, mildly increased size of the lateral and third ventricles with increased prominence of the temporal horns; for example, the maximum collective transverse dimension of the frontal   horns of the lateral ventricles measures 4.9 cm, previously 4.4 cm (series 2 image 13). Similar encephalomalacia involving the parasagittal/anterior frontal lobes, greater on the right. No evidence of an acute transcortical confluent infarct. Mild   presumed chronic small vessel ischemic changes. Mild generalized generalized cerebral parenchymal volume loss.    VISUALIZED ORBITS/SINUSES/MASTOIDS: No acute intraorbital finding. No evidence of significant paranasal sinus or mastoid mucosal disease.     BONES/SOFT TISSUES: No acute abnormality.    HEAD CTA:  ANTERIOR CIRCULATION: No high-grade stenosis, occlusion, new aneurysm, or high-flow vascular malformation. Redemonstration of findings related to prior anterior cerebral artery stenting and A3 segment aneurysm embolization without evidence of residual   aneurysm. Unchanged 3 mm mm outpouching arising from the cavernous right ICA (series 6 image 399). Unchanged 3 mm outpouching arising from the right MCA bifurcation projecting posteriorly and slightly superiorly (series 6 image 424). Standard [place] of   Abel anatomy.    POSTERIOR CIRCULATION: No high-grade stenosis, occlusion, aneurysm, or high-flow vascular malformation. Balanced vertebral arteries.     DURAL VENOUS SINUSES: Not well evaluated on a technical basis.    NECK CTA:  RIGHT CAROTID: No hemodynamically significant stenosis or dissection. Similar mild mildly irregular predominantly noncalcified atherosclerotic plaque at the carotid bifurcation and proximal ICA.    LEFT CAROTID: No hemodynamically significant stenosis or dissection.    VERTEBRAL ARTERIES: Similar mild narrowing at the right vertebral artery origin. No focal high-grade stenosis or dissection.  Balanced vertebral arteries.    AORTIC ARCH: Classic aortic arch anatomy with no significant stenosis at the origin of the great vessels. Similar mild narrowing involving the origin and proximal segment of the left subclavian artery due to predominantly noncalcified atherosclerotic   plaque.    NONVASCULAR STRUCTURES: Mild centrilobular pulmonary emphysema. 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 Multilevel spondylosis with moderate interbody degenerative change at C5-C7 and advanced left C4-C5 facet arthrosis. No severe spinal canal   stenosis. Moderate to severe multilevel foraminal narrowing.      Impression    IMPRESSION:   HEAD CT:  1.  No acute intracranial hemorrhage, extra-axial fluid collection, or evidence of an acute transcortical infarct.  2.  Unchanged right frontal approach ventriculostomy catheter with slightly increased size of the supratentorial ventricles since 10/20/2023.  3.  Chronic changes, as described.    HEAD CTA:   1.  No large vessel occlusion or high-grade stenosis.  2.  Status post prior A3 segment left PATRICK stenting and aneurysm embolization without evidence of aneurysm recurrence.  3.  Unchanged 3 mm cavernous right ICA and MCA bifurcation aneurysms.    NECK CTA:  1.  No hemodynamically significant stenosis or dissection in the neck vessels.    Preliminary noncontrast head CT findings were discussed over the phone with Dr. Arriola on 11/05/2023 at 1520 CST.     Recent Labs   Lab 11/05/23  1542   WBC 11.3*   HGB 12.2   MCV 89      INR 1.11      POTASSIUM 4.1   CHLORIDE 99   CO2 26   BUN 18.3   CR 0.79   ANIONGAP 11   LUIS MANUEL 9.2   *     EKG:Nsr no acute changes       Katalina Marr MD  [facility]  [phone]

## 2023-11-05 NOTE — ED PROVIDER NOTES
History     Chief Complaint   Patient presents with    Stroke Symptoms     HPI  Soila Meade is a 68 year old female who presents emergency department via EMS from the nursing home secondary to stroke symptoms.  Last known well time was yesterday.  She has a history of brain hemorrhage, cerebral aneurysms with clipping and still has some aneurysms that have not been clipped.  She is in a nursing home.  At baseline she cannot walk due to bilateral lower extremity weakness.  She normally can speak and is conversive.  She did complain of a headache today.  She has bilateral upper extremity weakness but it seems to be more on the left side.  It was noted that she had a left facial droop.    Allergies:  No Known Allergies    Problem List:    Patient Active Problem List    Diagnosis Date Noted    Aphasia 11/05/2023     Priority: Medium    Former smoker 01/20/2020     Priority: Medium     Formatting of this note might be different from the original. Quit 2018. 1/2 ppd x 10 yr.      Chronic midline low back pain without sciatica 03/06/2018     Priority: Medium    Osteopenia 01/24/2017     Priority: Medium    Essential hypertension 06/06/2016     Priority: Medium    Degeneration of cervical intervertebral disc 10/15/2013     Priority: Medium    Personal history of colonic polyps 02/05/2013     Priority: Medium    Insomnia 05/29/2012     Priority: Medium        Past Medical History:    Past Medical History:   Diagnosis Date    Chronic midline low back pain without sciatica 03/06/2018    Degeneration of cervical intervertebral disc 10/15/2013    Essential hypertension 06/06/2016    Former smoker 01/20/2020    Insomnia 05/29/2012    Osteopenia 01/24/2017    Personal history of colonic polyps 02/05/2013       Past Surgical History:    History reviewed. No pertinent surgical history.    Family History:    History reviewed. No pertinent family history.    Social History:  Marital Status:  Unknown [6]        Medications:     acetaminophen (TYLENOL) 325 MG tablet  aspirin 81 MG EC tablet  clopidogrel (PLAVIX) 75 MG tablet  ferrous sulfate (FE TABS) 325 (65 Fe) MG EC tablet  levETIRAcetam (KEPPRA) 100 MG/ML oral solution  lisinopril (ZESTRIL) 5 MG tablet  melatonin 5 MG tablet  metoprolol tartrate (LOPRESSOR) 25 MG tablet  nicotine (NICODERM CQ) 14 MG/24HR 24 hr patch  pantoprazole (PROTONIX) 40 MG EC tablet  sennosides (SENOKOT) 8.6 MG tablet  vitamin C (ASCORBIC ACID) 500 MG tablet          Review of Systems   All other systems reviewed and are negative.      Physical Exam   BP: (!) 142/87  Pulse: 90  Temp: 98.7  F (37.1  C)  Resp: 18  Weight: 64.4 kg (142 lb)  SpO2: 95 %      Physical Exam  Vitals and nursing note reviewed.   Constitutional:       General: She is not in acute distress.     Appearance: Normal appearance. She is well-developed.   HENT:      Head: Normocephalic and atraumatic.      Right Ear: External ear normal.      Left Ear: External ear normal.      Nose: Nose normal. No congestion or rhinorrhea.      Mouth/Throat:      Mouth: Mucous membranes are moist.   Eyes:      General: No scleral icterus.     Conjunctiva/sclera: Conjunctivae normal.   Cardiovascular:      Rate and Rhythm: Normal rate and regular rhythm.   Pulmonary:      Effort: Pulmonary effort is normal. No respiratory distress.   Abdominal:      General: Abdomen is flat.   Musculoskeletal:         General: No swelling.      Cervical back: Normal range of motion and neck supple.      Right lower leg: No edema.      Left lower leg: No edema.      Comments: Patient will not follow directions to lift her legs off the bed.     Skin:     General: Skin is warm and dry.      Findings: No rash.   Neurological:      General: No focal deficit present.      Mental Status: She is alert and oriented to person, place, and time.      Sensory: No sensory deficit.      Motor: Weakness present.      Comments: Slight left-sided facial droop.  Will not raise legs off the bed.    strength is weak and symmetric bilaterally.  Unable to hold arms out in front of her.   Psychiatric:         Mood and Affect: Mood normal.         ED Course                 Procedures              EKG Interpretation:      Interpreted by Corey Arriola MD  Time reviewed: 1530  Symptoms at time of EKG: stroke symptoms   Rhythm: normal sinus   Rate: normal  Axis: normal  Ectopy: none  Conduction: normal  ST Segments/ T Waves: No ST-T wave changes  Q Waves: none  Comparison to prior: Unchanged    Clinical Impression: normal EKG        The patient has stroke symptoms:         ED Stroke specific documentation           NIHSS PDF     Patient last known well time: yesterday evening  ED Provider first to bedside at: on arrival   CT Results received at: 1520    Thrombolytics:   Not given due to:   - unclear or unfavorable risk-benefit profile for extended window thrombolysis beyond the conventional 4.5 hour time window    If treating with thrombolytics: Ensure SBP<180 and DBP<105 prior to treatment with thrombolytics.  Administering thrombolytics after treatment with IV labetalol, hydralazine, or nicardipine is reasonable once BP control is established.    Endovascular Retrieval:  Not initiated due to absence of proximal vessel occlusion    National Institutes of Health Stroke Scale (Baseline)  Time Performed: 1500     Score    Level of consciousness: (0)   Alert, keenly responsive    LOC questions: (2)   Answers neither question correctly    LOC commands: (0)   Performs both tasks correctly    Best gaze: (0)   Normal    Visual: (0)   No visual loss    Facial palsy: (1)   Minor paralysis (flat nasolabial fold, smile asymmetry)    Motor arm (left): (2)   Some effort against gravity    Motor arm (right): (2)   Some effort against gravity    Motor leg (left): (3)   No effort against gravity    Motor leg (right): (3)   No effort against gravity    Limb ataxia: (0)   Absent    Sensory: (0)   Normal- no sensory loss    Best  language: (3)   [place]ute- global aphasia    Dysarthria: UN Intubated or other physical barrier: global aphasia     Extinction and inattention: (0)   No abnormality        Total Score:  16        Stroke Mimics were considered (including migraine headache, seizure disorder, hypoglycemia (or hyperglycemia), head or spinal trauma, CNS infection, Toxin ingestion and shock state (e.g. sepsis) .             Results for orders placed or performed during the hospital encounter of 11/05/23 (from the past 24 hour(s))   CT Head w/o Contrast    Narrative    EXAM: CTA HEAD NECK W CONTRAST, CT HEAD W/O CONTRAST  LOCATION: [facility]  DATE: 11/5/2023    INDICATION: Code Stroke to evaluate for potential thrombolysis and thrombectomy. PLEASE READ IMMEDIATELY.  COMPARISON: Head CT dated 10/20/2023 and CTA dated 06/01/2023.   CONTRAST: 70mL, Isovue 370  TECHNIQUE: Head and neck CT angiogram with IV contrast. Noncontrast head CT followed by axial helical CT images of the head and neck vessels obtained during the arterial phase of intravenous contrast administration. Axial 2D reconstructed images and   multiplanar 3D MIP reconstructed images of the head and neck vessels were performed by the technologist. Dose reduction techniques were used. All stenosis measurements made according to NASCET criteria unless otherwise specified.    FINDINGS:   NONCONTRAST HEAD CT:   INTRACRANIAL CONTENTS: No acute intracranial hemorrhage, extraaxial collection, or midline shift. Redemonstration of a right frontal approach ventriculostomy catheter terminating at the left foramen of Monro. Changes related to prior left frontal   approach ventriculostomy catheter. When remeasured in a similar fashion, mildly increased size of the lateral and third ventricles with increased prominence of the temporal horns; for example, the maximum collective transverse dimension of the frontal   horns of the lateral ventricles measures 4.9 cm,  previously 4.4 cm (series 2 image 13). Similar encephalomalacia involving the parasagittal/anterior frontal lobes, greater on the right. No evidence of an acute transcortical confluent infarct. Mild   presumed chronic small vessel ischemic changes. Mild generalized generalized cerebral parenchymal volume loss.    VISUALIZED ORBITS/SINUSES/MASTOIDS: No acute intraorbital finding. No evidence of significant paranasal sinus or mastoid mucosal disease.     BONES/SOFT TISSUES: No acute abnormality.    HEAD CTA:  ANTERIOR CIRCULATION: No high-grade stenosis, occlusion, new aneurysm, or high-flow vascular malformation. Redemonstration of findings related to prior anterior cerebral artery stenting and A3 segment aneurysm embolization without evidence of residual   aneurysm. Unchanged 3 mm mm outpouching arising from the cavernous right ICA (series 6 image 399). Unchanged 3 mm outpouching arising from the right MCA bifurcation projecting posteriorly and slightly superiorly (series 6 image 424). Standard [place] of   [place] anatomy.    POSTERIOR CIRCULATION: No high-grade stenosis, occlusion, aneurysm, or high-flow vascular malformation. Balanced vertebral arteries.     DURAL VENOUS SINUSES: Not well evaluated on a technical basis.    NECK CTA:  RIGHT CAROTID: No hemodynamically significant stenosis or dissection. Similar mild mildly irregular predominantly noncalcified atherosclerotic plaque at the carotid bifurcation and proximal ICA.    LEFT CAROTID: No hemodynamically significant stenosis or dissection.    VERTEBRAL ARTERIES: Similar mild narrowing at the right vertebral artery origin. No focal high-grade stenosis or dissection. Balanced vertebral arteries.    AORTIC ARCH: Classic aortic arch anatomy with no significant stenosis at the origin of the great vessels. Similar mild narrowing involving the origin and proximal segment of the left subclavian artery due to predominantly noncalcified atherosclerotic    plaque.    NONVASCULAR STRUCTURES: Mild centrilobular pulmonary emphysema. 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 Multilevel spondylosis with moderate interbody degenerative change at C5-C7 and advanced left C4-C5 facet arthrosis. No severe spinal canal   stenosis. Moderate to severe multilevel foraminal narrowing.      Impression    IMPRESSION:   HEAD CT:  1.  No acute intracranial hemorrhage, extra-axial fluid collection, or evidence of an acute transcortical infarct.  2.  Unchanged right frontal approach ventriculostomy catheter with slightly increased size of the supratentorial ventricles since 10/20/2023.  3.  Chronic changes, as described.    HEAD CTA:   1.  No large vessel occlusion or high-grade stenosis.  2.  Status post prior A3 segment left PATRICK stenting and aneurysm embolization without evidence of aneurysm recurrence.  3.  Unchanged 3 mm cavernous right ICA and MCA bifurcation aneurysms.    NECK CTA:  1.  No hemodynamically significant stenosis or dissection in the neck vessels.    Preliminary noncontrast head CT findings were discussed over the phone with Dr. Arriola on 11/05/2023 at 1520 CST.   CTA Head Neck with Contrast    Narrative    EXAM: CTA HEAD NECK W CONTRAST, CT HEAD W/O CONTRAST  LOCATION: [facility]  DATE: 11/5/2023    INDICATION: Code Stroke to evaluate for potential thrombolysis and thrombectomy. PLEASE READ IMMEDIATELY.  COMPARISON: Head CT dated 10/20/2023 and CTA dated 06/01/2023.   CONTRAST: 70mL, Isovue 370  TECHNIQUE: Head and neck CT angiogram with IV contrast. Noncontrast head CT followed by axial helical CT images of the head and neck vessels obtained during the arterial phase of intravenous contrast administration. Axial 2D reconstructed images and   multiplanar 3D MIP reconstructed images of the head and neck vessels were performed by the technologist. Dose reduction techniques were used. All stenosis measurements made according to NASCET  criteria unless otherwise specified.    FINDINGS:   NONCONTRAST HEAD CT:   INTRACRANIAL CONTENTS: No acute intracranial hemorrhage, extraaxial collection, or midline shift. Redemonstration of a right frontal approach ventriculostomy catheter terminating at the left foramen of Monro. Changes related to prior left frontal   approach ventriculostomy catheter. When remeasured in a similar fashion, mildly increased size of the lateral and third ventricles with increased prominence of the temporal horns; for example, the maximum collective transverse dimension of the frontal   horns of the lateral ventricles measures 4.9 cm, previously 4.4 cm (series 2 image 13). Similar encephalomalacia involving the parasagittal/anterior frontal lobes, greater on the right. No evidence of an acute transcortical confluent infarct. Mild   presumed chronic small vessel ischemic changes. Mild generalized generalized cerebral parenchymal volume loss.    VISUALIZED ORBITS/SINUSES/MASTOIDS: No acute intraorbital finding. No evidence of significant paranasal sinus or mastoid mucosal disease.     BONES/SOFT TISSUES: No acute abnormality.    HEAD CTA:  ANTERIOR CIRCULATION: No high-grade stenosis, occlusion, new aneurysm, or high-flow vascular malformation. Redemonstration of findings related to prior anterior cerebral artery stenting and A3 segment aneurysm embolization without evidence of residual   aneurysm. Unchanged 3 mm mm outpouching arising from the cavernous right ICA (series 6 image 399). Unchanged 3 mm outpouching arising from the right MCA bifurcation projecting posteriorly and slightly superiorly (series 6 image 424). Standard [place] of   Abel anatomy.    POSTERIOR CIRCULATION: No high-grade stenosis, occlusion, aneurysm, or high-flow vascular malformation. Balanced vertebral arteries.     DURAL VENOUS SINUSES: Not well evaluated on a technical basis.    NECK CTA:  RIGHT CAROTID: No hemodynamically significant stenosis or  dissection. Similar mild mildly irregular predominantly noncalcified atherosclerotic plaque at the carotid bifurcation and proximal ICA.    LEFT CAROTID: No hemodynamically significant stenosis or dissection.    VERTEBRAL ARTERIES: Similar mild narrowing at the right vertebral artery origin. No focal high-grade stenosis or dissection. Balanced vertebral arteries.    AORTIC ARCH: Classic aortic arch anatomy with no significant stenosis at the origin of the great vessels. Similar mild narrowing involving the origin and proximal segment of the left subclavian artery due to predominantly noncalcified atherosclerotic   plaque.    NONVASCULAR STRUCTURES: Mild centrilobular pulmonary emphysema. 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 Multilevel spondylosis with moderate interbody degenerative change at C5-C7 and advanced left C4-C5 facet arthrosis. No severe spinal canal   stenosis. Moderate to severe multilevel foraminal narrowing.      Impression    IMPRESSION:   HEAD CT:  1.  No acute intracranial hemorrhage, extra-axial fluid collection, or evidence of an acute transcortical infarct.  2.  Unchanged right frontal approach ventriculostomy catheter with slightly increased size of the supratentorial ventricles since 10/20/2023.  3.  Chronic changes, as described.    HEAD CTA:   1.  No large vessel occlusion or high-grade stenosis.  2.  Status post prior A3 segment left PATRICK stenting and aneurysm embolization without evidence of aneurysm recurrence.  3.  Unchanged 3 mm cavernous right ICA and MCA bifurcation aneurysms.    NECK CTA:  1.  No hemodynamically significant stenosis or dissection in the neck vessels.    Preliminary noncontrast head CT findings were discussed over the phone with Dr. Arriola on 11/05/2023 at 1520 CST.   CBC with Platelets & Differential    Narrative    The following orders were created for panel order CBC with Platelets & Differential.  Procedure                               Abnormality          Status                     ---------                               -----------         ------                     CBC with platelets and d...[[id number]]  Abnormal            Final result                 Please view results for these tests on the individual orders.   Basic metabolic panel   Result Value Ref Range    Sodium 136 135 - 145 mmol/L    Potassium 4.1 3.4 - 5.3 mmol/L    Chloride 99 98 - 107 mmol/L    Carbon Dioxide (CO2) 26 22 - 29 mmol/L    Anion Gap 11 7 - 15 mmol/L    Urea Nitrogen 18.3 8.0 - 23.0 mg/dL    Creatinine 0.79 0.51 - 0.95 mg/dL    GFR Estimate 81 >60 mL/min/1.73m2    Calcium 9.2 8.8 - 10.2 mg/dL    Glucose 110 (H) 70 - 99 mg/dL   INR   Result Value Ref Range    INR 1.11 0.85 - 1.15   Partial thromboplastin time   Result Value Ref Range    aPTT 26 22 - 38 Seconds   Troponin T, High Sensitivity   Result Value Ref Range    Troponin T, High Sensitivity 11 <=14 ng/L   CBC with platelets and differential   Result Value Ref Range    WBC Count 11.3 (H) 4.0 - 11.0 10e3/uL    RBC Count 4.26 3.80 - 5.20 10e6/uL    Hemoglobin 12.2 11.7 - 15.7 g/dL    Hematocrit 38.1 35.0 - 47.0 %    MCV 89 78 - 100 fL    MCH 28.6 26.5 - 33.0 pg    MCHC 32.0 31.5 - 36.5 g/dL    RDW 13.1 10.0 - 15.0 %    Platelet Count 426 150 - 450 10e3/uL    % Neutrophils 76 %    % Lymphocytes 16 %    % Monocytes 6 %    % Eosinophils 1 %    % Basophils 0 %    % Immature Granulocytes 1 %    NRBCs per 100 WBC 0 <1 /100    Absolute Neutrophils 8.5 (H) 1.6 - 8.3 10e3/uL    Absolute Lymphocytes 1.8 0.8 - 5.3 10e3/uL    Absolute Monocytes 0.7 0.0 - 1.3 10e3/uL    Absolute Eosinophils 0.2 0.0 - 0.7 10e3/uL    Absolute Basophils 0.0 0.0 - 0.2 10e3/uL    Absolute Immature Granulocytes 0.1 <=0.4 10e3/uL    Absolute NRBCs 0.0 10e3/uL   UA with Microscopic reflex to Culture    Specimen: Urine, Clean Catch   Result Value Ref Range    Color Urine Yellow Colorless, Straw, Light Yellow, Yellow    Appearance Urine Cloudy (A) Clear    Glucose Urine  "Negative Negative mg/dL    Bilirubin Urine Negative Negative    Ketones Urine Negative Negative mg/dL    Specific Gravity Urine >=1.030 1.003 - 1.035    Blood Urine Small (A) Negative    pH Urine 5.0 5.0 - 7.0    Protein Albumin Urine 30 (A) Negative mg/dL    Urobilinogen Urine Normal Normal, 2.0 mg/dL    Nitrite Urine Negative Negative    Leukocyte Esterase Urine Large (A) Negative    Bacteria Urine Few (A) None Seen /HPF    WBC Clumps Urine Present (A) None Seen /HPF    RBC Urine 23 (H) <=2 /HPF    WBC Urine >182 (H) <=5 /HPF    Narrative    Urine Culture ordered based on laboratory criteria       Medications   Saline 100mL Bag (100 mLs Intravenous $Given 11/5/23 1513)   iopamidol (ISOVUE-370) solution 500 mL (70 mLs Intravenous $Given 11/5/23 1513)       Assessments & Plan (with Medical Decision Making)  68-year-old female who presented to the emergency department secondary to stroke symptoms.  She has a history of brain hemorrhage and aneurysm repair along with aneurysm clips.  She normally is unable to walk.  Per family patient is not speaking now and normally is able to speak.  She also has had a little bit of a left facial droop.  Patient is outside of the window for tPA and somewhat risky for tPA given her history of brain hemorrhage and aneurysms.  CT scan of the head and CTA show no acute findings.  I discussed case with stroke neurology who recommended admission for observation, no aspirin or other medications at this time, repeat head CT tomorrow or MRI if available and if the previous aneurysm surgical devices are MRI safe.  On further discussion with the patient's family the patient had a stent and a \"web procedure.  They are uncertain whether or not these are MRI safe.  Previous urinalysis on the 23rd showed greater than 182 white cells but the urine culture showed mixed urogenital pascual.  Discussed with the hospitalist,  who accepts the patient for admission and would like me to write " holding orders.  Family is in agreement with this disposition.     I have reviewed the nursing notes.    I have reviewed the findings, diagnosis, plan and need for follow up with the patient.                  New Prescriptions    No medications on file       Final diagnoses:   Aphasia       11/5/2023   [facility] EMERGENCY DEPT       Corey Arriola MD  11/05/23 1760

## 2023-11-06 NOTE — CONSULTS
[facility]    Stroke Consult Note    Reason for Consult:  aphasia    Chief Complaint: Stroke Symptoms       HPI  Soila Meade is a 68 year old female with PMH of HTN, PATRICK aneurysm + ICH s/p clipping, on Keppra, R frontal approach ventriculostomy catheter, L SDH and baseline BLE weakness requiring wheelchair for mobility. She presented to the ED 11/5/23 for evaluation of speech changes. LKW was sometime the day prior when it was noticed that she had less speech output, also concern for L facial droop and possible L>R arm weakness. Presenting /87. CT with R frontal gliosis and encephalomalacia, CTA without LVO or significant finding. Concern for stroke vs encephalopathy; admitted for observation and MRI (vs CT if unable to verify aneurysm clips MRI compatible).    Family was present during telestroke evaluation today, and able to provide collateral history. Two weeks ago, she began to develop generalized weakness and malaise. UA/cultures obtained and she was not started on antibiotics. She has felt generally unwell since then, but was still bright, talkative, and mentally at baseline. Two days ago, family noticed that she seemed withdrawn, flat, and not producing spontaneous speech. Her mouth is also quivering, and they report intermittent right hand tremor, both of which happened prior to her hemorrhage (which is why they were concerned and brought her in).    On exam, she has a flat affect and appears withdrawn. She follows commands but is slow. She names/repeats appropriately but has to be encouraged to answer. Both legs are weak, upward drift in the right arm and has a mouth tremor at rest. Family reports other periods like this, lasting a few hours each. Per OSH discharge summary 8/28/23, she had an episode of confusion, flat affect and L facial droop while admitted. Repeat EEG, MRI not obtained at that time. Repeat CTH today without evidence of acute ischemia.  "    Stroke Evaluation Summarized    MRI/Head CT CT 11/6: No interval development of acute stroke.   CT: no hemorrhage or other acute findings; R>L frontal encephalomalacia; R frontal approach ventriculostomy catheter, changes related to prior L frontal approach ventriculostomy catheter; possible mildly increased size of lateral and third ventricles    Intracranial Vasculature CTA: no LVO or severe stenosis; s/p L A3 stenting and aneurysm embolization without evidence of reoccurrence; stable 3mm cavernous R ICA and MCA bifurcation aneurysms    Cervical Vasculature CTA: No LVO, significant stenosis or dissection      Echocardiogram EF >70%, LA not well visualized   EKG/Telemetry Sinus rhythm    Other Testing Keppra level: 25.5      LDL  11/6/2023: 72 mg/dL    A1C  11/6/2023: 5.4 %    Troponin 11/5/2023: 11 ng/L       Impression  Encephalopathy without clear new focal neurologic deficits. Given non-focal history and exam, suspicion for new ischemia is low. Consideration for infectious/metabolic encephalopathy vs seizure vs recrudescence of prior stroke symptoms.     Recommendations   - Continue DAPT with aspirin + Plavix  - Continue Keppra  - Transfer to facility with EEG/general neurology for further evaluation of tremor, potential seizure  - Management of UTI per primary team    Thank you for this consult. No further stroke evaluation indicated at this time.     Vandana See, CNP  Vascular Neurology    To page me or covering stroke neurology team member, click here: [facility]  Choose \"On Call\" tab at top, then select \"NEUROLOGY/ALL SITES\" from middle drop-down box, press Enter, then look for \"stroke\" or \"telestroke\" for your site.  _____________________________________________________    Clinically Significant Risk Factors Present on Admission                # Drug Induced Platelet Defect: home medication list includes an antiplatelet medication   # Hypertension: Noted on problem list          # " Financial/Environmental Concerns: none         Past Medical History    Past Medical History:   Diagnosis Date    Chronic midline low back pain without sciatica 03/06/2018    Degeneration of cervical intervertebral disc 10/15/2013    Essential hypertension 06/06/2016    Former smoker 01/20/2020    Formatting of this note might be different from the original. Quit 2018. 1/2 ppd x 10 yr.    Insomnia 05/29/2012    Osteopenia 01/24/2017    Personal history of colonic polyps 02/05/2013     Medications   Home Meds  Prior to Admission medications    Medication Sig Start Date End Date Taking? Authorizing Provider   acetaminophen (TYLENOL) 325 MG tablet Take 650 mg by mouth every 8 hours as needed for pain   Yes Reported, Patient   aspirin 81 MG EC tablet Take 81 mg by mouth daily   Yes Reported, Patient   clopidogrel (PLAVIX) 75 MG tablet Take 75 mg by mouth daily Cerebral aneurysm, nonruptured 10/25/23  Yes Reported, Patient   ferrous sulfate (FE TABS) 325 (65 Fe) MG EC tablet Take 325 mg by mouth daily   Yes Reported, Patient   levETIRAcetam (KEPPRA) 100 MG/ML oral solution Take 10 mLs by mouth 2 times daily Cerebral aneurysm, nonruptured 10/31/23  Yes Reported, Patient   lisinopril (ZESTRIL) 5 MG tablet Take 5 mg by mouth daily HOLD for systolic BP <110 2/13/23  Yes Reported, Patient   melatonin 5 MG tablet Take 5 mg by mouth at bedtime   Yes Reported, Patient   metoprolol tartrate (LOPRESSOR) 25 MG tablet Take 25 mg by mouth 2 times daily 10/23/23  Yes Reported, Patient   nicotine (NICODERM CQ) 14 MG/24HR 24 hr patch Place 1 patch onto the skin every 24 hours   Yes Reported, Patient   pantoprazole (PROTONIX) 40 MG EC tablet Take 40 mg by mouth daily 10/12/23  Yes Reported, Patient   sennosides (SENOKOT) 8.6 MG tablet Take 1 tablet by mouth 2 times daily as needed for constipation   Yes Reported, Patient   vitamin C (ASCORBIC ACID) 500 MG tablet Take 500 mg by mouth daily   Yes Reported, Patient       Scheduled Meds    aspirin  81 mg Oral Daily    cefTRIAXone  1 g Intravenous Q24H    clopidogrel  75 mg Oral Daily    levETIRAcetam  1,000 mg Oral BID    nicotine  1 patch Transdermal Daily    And    nicotine   Transdermal Q8H BRI    pantoprazole  40 mg Oral QAM AC       Infusion Meds      Allergies   No Known Allergies       PHYSICAL EXAMINATION   Temp:  [97.5  F (36.4  C)-100.7  F (38.2  C)] 99.2  F (37.3  C)  Pulse:  [] 98  Resp:  [13-23] 18  BP: (130-172)/(74-95) 142/87  SpO2:  [92 %-98 %] 97 %    Neuro Exam  Mental Status:   alert, correctly states month, states age 58, slow to participate but able to follow commands, names 2/2 objects correctly, paucity of speech, flat affect  Cranial Nerves:  visual fields intact (tested by nurse), facial sensation intact and symmetric (tested by nurse), hearing not formally tested but intact to conversation, no dysarthria, shoulder shrug equal bilaterally, tongue protrusion midline, left lower facial droop, EOM exam limited by camera positioning - no obvious gaze palsy  Motor:   mouth tremor at rest,  no pronator drift (RUE upward drift), bilateral legs not antigravity but able to wiggle toes  Reflexes:  unable to test (telestroke)  Sensory:  light touch sensation intact and symmetric throughout upper and lower extremities (assessed by nurse), no extinction on double simultaneous stimulation (assessed by nurse)  Coordination:   FTN without dysmetria  Station/Gait:  unable to test due to telestroke    Stroke Scales    NIHSS  1a. Level of Consciousness 0-->Alert, keenly responsive   1b. LOC Questions 1-->Answers one question correctly   1c. LOC Commands 0-->Performs both tasks correctly   2.   Best Gaze 1-->Partial gaze palsy, gaze is abnormal in one or both eyes, but forced deviation or total gaze paresis is not present   3.   Visual 0-->No visual loss   4.   Facial Palsy 1-->Minor paralysis (flattened nasolabial fold, asymmetry on smiling)   5a. Motor Arm, Left 0-->No drift, limb holds  "90 (or 45) degrees for full 10 secs   5b. Motor Arm, Right 0-->No drift, limb holds 90 (or 45) degrees for full 10 secs   6a. Motor Leg, Left 3-->No effort against gravity, leg falls to bed immediately   6b. Motor Leg, right 3-->No effort against gravity, leg falls to bed immediately   7.   Limb Ataxia 0-->Absent   8.   Sensory 0-->Normal, no sensory loss   9.   Best Language 1-->Mild-to-moderate aphasia, some obvious loss of fluency or facility of comprehension, without significant limitation on ideas expressed or form of expression. Reduction of speech and/or comprehension, however, makes conversation. . . (see row details)   10. Dysarthria 0-->Normal   11. Extinction and Inattention  0-->No abnormality   Total 10 (11/06/23 1633)       Imaging  I personally reviewed all imaging; relevant findings per HPI.    Labs Data   CBC  Recent Labs   Lab 11/06/23  0609 11/05/23  1542   WBC 8.7 11.3*   RBC 4.28 4.26   HGB 12.3 12.2   HCT 37.5 38.1    426     Basic Metabolic Panel   Recent Labs   Lab 11/06/23  0609 11/05/23  1542    136   POTASSIUM 3.8 4.1   CHLORIDE 100 99   CO2 23 26   BUN 16.2 18.3   CR 0.70 0.79   GLC 94 110*   LUIS MANUEL 9.6 9.2     Liver Panel  No results for input(s): \"PROTTOTAL\", \"ALBUMIN\", \"BILITOTAL\", \"ALKPHOS\", \"AST\", \"ALT\", \"BILIDIRECT\" in the last 168 hours.  INR    Recent Labs   Lab Test 11/05/23  1542   INR 1.11           Stroke Consult Data Data   Telestroke Service Details  (for non-emergent stroke consult with tele)  Video start time 11/06/23   1521   Video end time 11/06/23   1547   Type of service telemedicine diagnostic assessment of acute neurological changes   Reason telemedicine is appropriate patient requires assessment with a specialist for diagnosis and treatment of neurological symptoms   Mode of transmission secure interactive audio and video communication per [place]   Originating site (patient location) [facility]    Distant site (provider " location) [facility]       I have personally spent a total of 60 minutes providing care today, time spent in reviewing medical records and reviewing tests, examining the patient and obtaining history, coordination of care, and discussion with the patient and/or family regarding diagnostic results, prognosis, symptom management, risks and benefits of management options, and development of plan of care. Greater than 50% was spent in counseling and coordination of care.

## 2023-11-06 NOTE — PROGRESS NOTES
1648  Update  Soila Meade is a 68 year old female with PMH of HTN, PATRICK aneurysm + ICH s/p clipping, on Keppra, R frontal approach ventriculostomy catheter, L SDH and baseline BLE weakness requiring wheelchair for mobility. She presented to the ED 11/5/23 for evaluation of speech changes. LKW was sometime the day prior when it was noticed that she had less speech output, also concern for L facial droop and possible L>R arm weakness. Presenting /87. CT with R frontal gliosis and encephalomalacia, CTA without LVO or significant finding. Concern for stroke vs encephalopathy; admitted for observation and MRI (vs CT if unable to verify aneurysm clips MRI compatible).       Patient had visit with stroke neuro at 1500.  Encephalopathy without clear new focal neurological deficits, suspicion for new ischemia is low.  Consider for infectious/metabolic encephalopathy versus seizure versus recrudescence of prior stroke symptoms.  At this time they are recommending transferring to higher level of care for a EEG and general neurology for further evaluation of tremor and potential seizure.    I called [facility] transfer center requesting transfer to an [facility] facility given patient's previous neurology care has been through [facility].  Currently there are no beds available and patient is placed on a wait list.    Discussed with family and they also asked me to for a bed within the [facility] system.  Transfer request within the [facility] system initiated and spoke to the transfer center.  They are to call back when a bed is located.      Aliyah Bradley CNP on 11/6/2023 at 5:03 PM

## 2023-11-06 NOTE — PROGRESS NOTES
[facility]    Medicine Progress Note - Hospitalist Service    Date of Admission:  11/5/2023    Assessment & Plan   68-year-old female who presented to the ED on November 6 secondary to strokelike symptoms.  Patient's family noted left-sided facial droop along with difficulty speaking.  Her last known well time was sometime November 5.  She has a history of brain hemorrhage, cerebral aneurysms with clipping and  shunt.  Other chronic conditions include hypertension, chronic low back pain.    Stroke code was activated at 1500 stroke neurology consulted, and de-escalated at 1534 head CT showed no acute findings right frontal gliosis and encephalomalacia.  CTA of head and neck showed no acute findings.  The impression aphasia versus encephalopathy.  Keppra level within normal limits.  Possible UTI with positive appearing UA, patient started on ceftriaxone.  Slightly elevated WBC at 11.3 on admission, normal this morning.  Electrolytes within normal limits.  Plan was to admit for observation overnight.  With possible MRI today vs repeat CT scan.  :    Aphasia   Dysphagia    Assessment: Today patient is speaking a little bit better.  Neurochecks have been stable.  RN this morning noted patient pocketing food.  No choking or coughing noted.  We are having difficulty obtaining information regarding patient's  shunt, clipping and at this time unable to do an MRI safely.    Plan:   -Repeating CT scan this afternoon with follow-up with stroke neuro at 1500  -Per stroke neuro okay to restart patient's prior to admission aspirin and Plavix  -Neurochecks every 4 hours  -Attempting to get access to patient's neurosurgery records from [facility].  -Continue Keppra, Keppra level within normal limits  -Speech therapy ordered, can see on November 7 - [facility] soft diet at baseline  -echo pending  Essential hypertension    Assessment: Prior to admission patient taking metoprolol, and lisinopril.   These were both held on admission.  Last evening patient did have some higher blood pressures although this morning blood pressure normal at 130/83    Plan:   We will continue to hold lisinopril and metoprolol until seen by stroke neuro.  Former smoker    Assessment: Former smoker but currently using nicotine patch    Plan: Continue prior to admission nicotine patch  UTI  Assessment: On admission UA appeared positive and patient started on ceftriaxone.  Urine culture pending  Plan:   Continue ceftriaxone  Monitor for urine culture results        Diet: Advance Diet as Tolerated: Regular Diet Adult    DVT Prophylaxis: Pneumatic Compression Devices  Dominguez Catheter: Not present  Lines: None     Cardiac Monitoring: ACTIVE order. Indication: stroke  Code Status: Full Code      Clinically Significant Risk Factors Present on Admission                # Drug Induced Platelet Defect: home medication list includes an antiplatelet medication   # Hypertension: Noted on problem list                 Disposition Plan      Expected Discharge Date: 11/06/2023                  The patient's care was discussed with the Patient, Patient's Family, and Neuro stroke Consultant(s).    Aliyah Bradley CNP  Hospitalist Service  [facility]  Securely message with GuidePal (more info)  Text page via [employer] Paging/Directory   ______________________________________________________________________    Interval History   Patient offers no new complaints today.  Patient's nurse did note some pocketing of food during breakfast.  No coughing or choking.  Patient has remained vitally stable    Physical Exam   Vital Signs: Temp: 97.5  F (36.4  C) Temp src: Axillary BP: (!) 144/84 Pulse: 89   Resp: 16 SpO2: 98 % O2 Device: None (Room air)    Weight: 133 lbs 13.11 oz    Constitutional: awake, alert, cooperative, no apparent distress, and appears stated age  Respiratory: No increased work of breathing, good air exchange, clear to  auscultation bilaterally, no crackles or wheezing  Cardiovascular: Normal apical impulse, regular rate and rhythm, normal S1 and S2, no S3 or S4, and no murmur noted  GI:  normal bowel sounds, soft, non-distended, non-tender, no masses palpated, no hepatosplenomegally  Skin: normal skin color, texture, turgor  Musculoskeletal: There is no redness, warmth, or swelling of the joints.  Full range of motion noted.    Neurologic: Patient is alert.  She is having difficulty with word finding, is able to string together 3 words in a row in response to questions.  With a smile mild left side facial droop noted bilateral hand grasps slightly weaker on the left.  Unable to lift legs off of bed.    Medical Decision Making       45 MINUTES SPENT BY ME on the date of service doing chart review, history, exam, documentation & further activities per the note.      Data     I have personally reviewed the following data over the past 24 hrs:    8.7  \   12.3   / 413     137 100 16.2 /  94   3.8 23 0.70 \     Trop: 11 BNP: N/A     TSH: N/A T4: N/A A1C: 5.4     INR:  1.11 PTT:  26   D-dimer:  N/A Fibrinogen:  N/A       Imaging results reviewed over the past 24 hrs:   Recent Results (from the past 24 hour(s))   CT Head w/o Contrast    Narrative    EXAM: CTA HEAD NECK W CONTRAST, CT HEAD W/O CONTRAST  LOCATION: [facility]  DATE: 11/5/2023    INDICATION: Code Stroke to evaluate for potential thrombolysis and thrombectomy. PLEASE READ IMMEDIATELY.  COMPARISON: Head CT dated 10/20/2023 and CTA dated 06/01/2023.   CONTRAST: 70mL, Isovue 370  TECHNIQUE: Head and neck CT angiogram with IV contrast. Noncontrast head CT followed by axial helical CT images of the head and neck vessels obtained during the arterial phase of intravenous contrast administration. Axial 2D reconstructed images and   multiplanar 3D MIP reconstructed images of the head and neck vessels were performed by the technologist. Dose reduction  techniques were used. All stenosis measurements made according to NASCET criteria unless otherwise specified.    FINDINGS:   NONCONTRAST HEAD CT:   INTRACRANIAL CONTENTS: No acute intracranial hemorrhage, extraaxial collection, or midline shift. Redemonstration of a right frontal approach ventriculostomy catheter terminating at the left foramen of Monro. Changes related to prior left frontal   approach ventriculostomy catheter. When remeasured in a similar fashion, mildly increased size of the lateral and third ventricles with increased prominence of the temporal horns; for example, the maximum collective transverse dimension of the frontal   horns of the lateral ventricles measures 4.9 cm, previously 4.4 cm (series 2 image 13). Similar encephalomalacia involving the parasagittal/anterior frontal lobes, greater on the right. No evidence of an acute transcortical confluent infarct. Mild   presumed chronic small vessel ischemic changes. Mild generalized generalized cerebral parenchymal volume loss.    VISUALIZED ORBITS/SINUSES/MASTOIDS: No acute intraorbital finding. No evidence of significant paranasal sinus or mastoid mucosal disease.     BONES/SOFT TISSUES: No acute abnormality.    HEAD CTA:  ANTERIOR CIRCULATION: No high-grade stenosis, occlusion, new aneurysm, or high-flow vascular malformation. Redemonstration of findings related to prior anterior cerebral artery stenting and A3 segment aneurysm embolization without evidence of residual   aneurysm. Unchanged 3 mm mm outpouching arising from the cavernous right ICA (series 6 image 399). Unchanged 3 mm outpouching arising from the right MCA bifurcation projecting posteriorly and slightly superiorly (series 6 image 424). Standard [place] of   [place]el anatomy.    POSTERIOR CIRCULATION: No high-grade stenosis, occlusion, aneurysm, or high-flow vascular malformation. Balanced vertebral arteries.     DURAL VENOUS SINUSES: Not well evaluated on a technical basis.    NECK  CTA:  RIGHT CAROTID: No hemodynamically significant stenosis or dissection. Similar mild mildly irregular predominantly noncalcified atherosclerotic plaque at the carotid bifurcation and proximal ICA.    LEFT CAROTID: No hemodynamically significant stenosis or dissection.    VERTEBRAL ARTERIES: Similar mild narrowing at the right vertebral artery origin. No focal high-grade stenosis or dissection. Balanced vertebral arteries.    AORTIC ARCH: Classic aortic arch anatomy with no significant stenosis at the origin of the great vessels. Similar mild narrowing involving the origin and proximal segment of the left subclavian artery due to predominantly noncalcified atherosclerotic   plaque.    NONVASCULAR STRUCTURES: Mild centrilobular pulmonary emphysema. 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 Multilevel spondylosis with moderate interbody degenerative change at C5-C7 and advanced left C4-C5 facet arthrosis. No severe spinal canal   stenosis. Moderate to severe multilevel foraminal narrowing.      Impression    IMPRESSION:   HEAD CT:  1.  No acute intracranial hemorrhage, extra-axial fluid collection, or evidence of an acute transcortical infarct.  2.  Unchanged right frontal approach ventriculostomy catheter with slightly increased size of the supratentorial ventricles since 10/20/2023.  3.  Chronic changes, as described.    HEAD CTA:   1.  No large vessel occlusion or high-grade stenosis.  2.  Status post prior A3 segment left PATRICK stenting and aneurysm embolization without evidence of aneurysm recurrence.  3.  Unchanged 3 mm cavernous right ICA and MCA bifurcation aneurysms.    NECK CTA:  1.  No hemodynamically significant stenosis or dissection in the neck vessels.    Preliminary noncontrast head CT findings were discussed over the phone with Dr. Arriola on 11/05/2023 at 1520 CST.   CTA Head Neck with Contrast    Narrative    EXAM: CTA HEAD NECK W CONTRAST, CT HEAD W/O CONTRAST  LOCATION: [facility]  DATE: 11/5/2023    INDICATION: Code Stroke to evaluate for potential thrombolysis and thrombectomy. PLEASE READ IMMEDIATELY.  COMPARISON: Head CT dated 10/20/2023 and CTA dated 06/01/2023.   CONTRAST: 70mL, Isovue 370  TECHNIQUE: Head and neck CT angiogram with IV contrast. Noncontrast head CT followed by axial helical CT images of the head and neck vessels obtained during the arterial phase of intravenous contrast administration. Axial 2D reconstructed images and   multiplanar 3D MIP reconstructed images of the head and neck vessels were performed by the technologist. Dose reduction techniques were used. All stenosis measurements made according to NASCET criteria unless otherwise specified.    FINDINGS:   NONCONTRAST HEAD CT:   INTRACRANIAL CONTENTS: No acute intracranial hemorrhage, extraaxial collection, or midline shift. Redemonstration of a right frontal approach ventriculostomy catheter terminating at the left foramen of Monro. Changes related to prior left frontal   approach ventriculostomy catheter. When remeasured in a similar fashion, mildly increased size of the lateral and third ventricles with increased prominence of the temporal horns; for example, the maximum collective transverse dimension of the frontal   horns of the lateral ventricles measures 4.9 cm, previously 4.4 cm (series 2 image 13). Similar encephalomalacia involving the parasagittal/anterior frontal lobes, greater on the right. No evidence of an acute transcortical confluent infarct. Mild   presumed chronic small vessel ischemic changes. Mild generalized generalized cerebral parenchymal volume loss.    VISUALIZED ORBITS/SINUSES/MASTOIDS: No acute intraorbital finding. No evidence of significant paranasal sinus or mastoid mucosal disease.     BONES/SOFT TISSUES: No acute abnormality.    HEAD CTA:  ANTERIOR CIRCULATION: No high-grade stenosis, occlusion, new aneurysm, or high-flow vascular malformation.  Redemonstration of findings related to prior anterior cerebral artery stenting and A3 segment aneurysm embolization without evidence of residual   aneurysm. Unchanged 3 mm mm outpouching arising from the cavernous right ICA (series 6 image 399). Unchanged 3 mm outpouching arising from the right MCA bifurcation projecting posteriorly and slightly superiorly (series 6 image 424). Standard [place] of   Abel anatomy.    POSTERIOR CIRCULATION: No high-grade stenosis, occlusion, aneurysm, or high-flow vascular malformation. Balanced vertebral arteries.     DURAL VENOUS SINUSES: Not well evaluated on a technical basis.    NECK CTA:  RIGHT CAROTID: No hemodynamically significant stenosis or dissection. Similar mild mildly irregular predominantly noncalcified atherosclerotic plaque at the carotid bifurcation and proximal ICA.    LEFT CAROTID: No hemodynamically significant stenosis or dissection.    VERTEBRAL ARTERIES: Similar mild narrowing at the right vertebral artery origin. No focal high-grade stenosis or dissection. Balanced vertebral arteries.    AORTIC ARCH: Classic aortic arch anatomy with no significant stenosis at the origin of the great vessels. Similar mild narrowing involving the origin and proximal segment of the left subclavian artery due to predominantly noncalcified atherosclerotic   plaque.    NONVASCULAR STRUCTURES: Mild centrilobular pulmonary emphysema. 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 Multilevel spondylosis with moderate interbody degenerative change at C5-C7 and advanced left C4-C5 facet arthrosis. No severe spinal canal   stenosis. Moderate to severe multilevel foraminal narrowing.      Impression    IMPRESSION:   HEAD CT:  1.  No acute intracranial hemorrhage, extra-axial fluid collection, or evidence of an acute transcortical infarct.  2.  Unchanged right frontal approach ventriculostomy catheter with slightly increased size of the supratentorial ventricles since 10/20/2023.  3.   Chronic changes, as described.    HEAD CTA:   1.  No large vessel occlusion or high-grade stenosis.  2.  Status post prior A3 segment left PATRICK stenting and aneurysm embolization without evidence of aneurysm recurrence.  3.  Unchanged 3 mm cavernous right ICA and MCA bifurcation aneurysms.    NECK CTA:  1.  No hemodynamically significant stenosis or dissection in the neck vessels.    Preliminary noncontrast head CT findings were discussed over the phone with Dr. Arriola on 11/05/2023 at 1520 CST.

## 2023-11-06 NOTE — CONSULTS
Care Management Initial Consult    General Information  Assessment completed with: Daughter Nakita, SNF nurse Maria Elena  Type of CM/SW Visit: Initial Assessment    Primary Care Provider verified and updated as needed: Yes   Readmission within the last 30 days: no previous admission in last 30 days      Reason for Consult: discharge planning  Advance Care Planning: Advance Care Planning Reviewed: present on chart        Communication Assessment  Patient's communication style: spoken language (English or Bilingual)    Hearing Difficulty or Deaf: no   Wear Glasses or Blind: yes    Cognitive  Cognitive/Neuro/Behavioral: .WDL except (lethargic)  Level of Consciousness: lethargic  Arousal Level: opens eyes spontaneously  Orientation: disoriented to, place, situation  Mood/Behavior: calm, cooperative  Best Language: 1 - Mild to moderate  Speech: clear, pace/rate variance    Living Environment:   People in home: facility resident     Current living Arrangements: extended care facility  [facility] ([facility])   Able to return to prior arrangements: yes     Family/Social Support:  Care provided by: other (see comments) (facility staff)  Provides care for: no one, unable/limited ability to care for self     Children          Description of Support System: Supportive, Involved    Support Assessment: Adequate family and caregiver support, Adequate social supports    Current Resources:   Patient receiving home care services: No     Community Resources: None  Equipment currently used at home: wheelchair, manual, walker, rolling  Supplies currently used at home: Incontinence Supplies    Employment/Financial:  Employment Status: retired        Financial Concerns: none     Does the patient's insurance plan have a 3 day qualifying hospital stay waiver?  Yes     Which insurance plan 3 day waiver is available? Alternative insurance waiver    Will the waiver be used for post-acute placement? Undetermined at  this time    Lifestyle & Psychosocial Needs:  Social Determinants of Health     Food Insecurity: Not on file   Depression: Not on file   Housing Stability: Not on file   Tobacco Use: Not on file   Financial Resource Strain: Not on file   Alcohol Use: Not on file   Transportation Needs: Not on file   Physical Activity: Not on file   Interpersonal Safety: Not on file   Stress: Not on file   Social Connections: Not on file     Functional Status:  Prior to admission patient needed assistance:   Dependent ADLs:: Bathing, Dressing, Eating, Grooming, Incontinence, Positioning, Transfers, Wheelchair-with assist, Toileting  Dependent IADLs:: Cleaning, Cooking, Laundry, Shopping, Meal Preparation, Medication Management, Money Management, Transportation, Incontinence     Mental Health Status:  Mental Health Status: No Current Concerns       Chemical Dependency Status:  Chemical Dependency Status: No Current Concerns           Values/Beliefs:  Spiritual, Cultural Beliefs, Jain Practices, Values that affect care: no               Additional Information:  Care Management spoke with patient's daughter Nakita and TCU nurse Maria Elena. CM introduced self and explained role.     Patient has been at [facility] (Main Phone: [phone] Admissions Phone: [phone] Fax: [phone]) U since June, 2023. Per daughter, they are waiting for a LTC bed as patient is not able to return home.     Patient requires assistance with all ADLs, but can feed self - eats a mechanical soft diet. TCU staff use a sit-to-stand lift for all transfers. Patient has always been slow to respond, but per Maria Elena, patient has declined in the past month. Patient has a bedhold at the TCU.     Plan: return to [facility] - has bedhold  Transport: [place] bus or agency    Care Management will continue to follow for discharge planning.       Aliyah Oneill, [facility]  Inpatient Care Coordinator   [facility] [phone]  M  [facility] [phone]

## 2023-11-06 NOTE — PROGRESS NOTES
S-(situation): Patient registered to Observation. Patient arrived to room 248 via stretcher from ED    B-(background): Stroke    A-(assessment): Pt. Alert, unable to assess orientation since pt mostly answers y/n questions. Wound noted on sacral area. Nicotine patch applied on right arm. BLE weak, can only wiggle feet. Right upper extremity stronger than left. Facial drooping noted. Purewick on place. Tele attached.  R-(recommendations): Orders and observation goals reviewed with patient    Nursing Observation criteria listed below was met:    Skin issues/needs documented:Yes  Isolation needs addressed and Signage up: NA  Fall Prevention: Education given and documented: Yes  Education Assessment documented:Yes  Admission Education Documented: Yes  New medication patient education completed and documented (Possible Side Effects of Common Medications handout): Yes  OBS video/handout Reviewed & Documented: Yes  Allergies Reviewed: Yes  Medication Reconciliation Complete: Yes  Home medications if not able to send immediately home with family stored here: NA  Reminder note placed in discharge instructions of home meds: NA  Patient has discharge needs (If yes, please explain): No  Patient discharge preferences addressed and charted on white board:  No  Provider notified that patient has arrived to the unit: Yes

## 2023-11-06 NOTE — PROGRESS NOTES
"PRIMARY DIAGNOSIS: \"GENERIC\" NURSING  OUTPATIENT/OBSERVATION GOALS TO BE MET BEFORE DISCHARGE:  ADLs back to baseline: No    Activity and level of assistance: W/C bound    Pain status: Pain free.    Return to near baseline physical activity: No     Pt not speaking much this shift but does follow commands and answers yes/no questions. Adequate urine output through purewick. Turn and repo Q2. Mepilex on coccyx for redness and abrasion. Neuros remain the same. Pt slightly stronger on R side.        Please review provider order for any additional goals.   Nurse to notify provider when observation goals have been met and patient is ready for discharge.  "

## 2023-11-06 NOTE — PROGRESS NOTES
Pt remains alert to self, she knows her name, birthday, and able to answer simple yes and no questions.   Mild facial droop persists.  She has been able to follow simple commands for neuro assessment.   She has equal but weak strength in upper and lower limbs.   Oral medication not given due to decreased level of alertness at that time and Pt would not wake long enough to safely take oral medication.   Purwick in place and working well.

## 2023-11-07 NOTE — PROGRESS NOTES
"PRIMARY DIAGNOSIS: \"GENERIC\" NURSING  OUTPATIENT/OBSERVATION GOALS TO BE MET BEFORE DISCHARGE:  ADLs back to baseline: Yes    Activity and level of assistance: A2 w/c bound    Pain status: Improved with use of alternative comfort measures i.e.: positioning    Return to near baseline physical activity: No, pt pocketing food.      Discharge Planner Nurse   Safe discharge environment identified: Yes  Barriers to discharge: Yes, transferring to higher level of care for EEG and further evaluation.       Entered by: Symone Sanchez RN 11/07/2023 9:39 AM     Please review provider order for any additional goals.   Nurse to notify provider when observation goals have been met and patient is ready for discharge.  "

## 2023-11-07 NOTE — PROGRESS NOTES
"PRIMARY DIAGNOSIS: \"GENERIC\" NURSING  OUTPATIENT/OBSERVATION GOALS TO BE MET BEFORE DISCHARGE:  ADLs back to baseline: Yes    Activity and level of assistance: A2 w/c bound    Pain status: Improved with use of alternative comfort measures i.e.: positioning    Return to near baseline physical activity: No    Pt lethargic overnight. Repositioning q2 hours. Adequate urine output.     Please review provider order for any additional goals.   Nurse to notify provider when observation goals have been met and patient is ready for discharge.  "

## 2023-11-07 NOTE — PROGRESS NOTES
11/07/23 1100   Appointment Info   Signing Clinician's Name / Credentials (SLP) Vale Blanchard MA, CCC-SLP   Quick Adds Certification   General Information   Onset of Illness/Injury or Date of Surgery 11/05/23   Referring Physician Katalina Marr MD   Patient/Family Therapy Goal Statement (SLP) Pt unable to state   Pertinent History of Current Problem Per chart review,  Soila Meade is a 68 year old female with PMH of HTN, PATRICK aneurysm + ICH s/p clipping, on Keppra, R frontal approach ventriculostomy catheter, L SDH and baseline BLE weakness requiring wheelchair for mobility. She presented to the ED 11/5/23 for evaluation of speech changes. LKW was sometime the day prior when it was noticed that she had less speech output, also concern for L facial droop and possible L>R arm weakness. Presenting /87. CT with R frontal gliosis and encephalomalacia, CTA without LVO or significant finding. Concern for stroke vs encephalopathy; admitted for observation and MRI (vs CT if unable to verify aneurysm clips MRI compatible). Patient had visit with stroke neuro.  Encephalopathy without clear new focal neurological deficits, suspicion for new ischemia is low.  Consider for infectious/metabolic encephalopathy versus seizure versus recrudescence of prior stroke symptoms.  At this time they are recommending transferring to higher level of care for a EEG and general neurology for further evaluation of tremor and potential seizure.  Orders received for swallow evaluation and speech/language assessment.   General Observations Patient sitting upright in chair throughout evaluation session. Patient demonstrating delayed processing and limited speech output.       Present no   Language English   Pain Assessment   Patient Currently in Pain No   Type of Evaluation   Type of Evaluation Swallow Evaluation;Speech, Language, Cognition   Oral Motor   Structural Abnormalities present   Mucosal Quality good    Dentition (Oral Motor)   Dentition (Oral Motor) edentulous   Lip Function (Oral Motor)   Lip Range of Motion (Oral Motor) unable/difficult to assess   Tongue Function (Oral Motor)   Tongue ROM (Oral Motor) unable/difficult to assess   Jaw Function (Oral Motor)   Jaw Function (Oral Motor) unable/difficult to assess   Vocal Quality/Secretion Management (Oral Motor)   Vocal Quality (Oral Motor) WNL   Secretion Management (Oral Motor) WNL   General Swallowing Observations   Past History of Dysphagia Mechanical soft; thin liquid diet at baseline   Comment, General Swallowing Observations Upon entering room SLP asked pt to open mouth. Pt with pocketing and food on mid tongue.   Respiratory Support room air   Current Diet/Method of Nutritional Intake (General Swallowing Observations, NIS) thin liquids (level 0);easy to chew (level 7)   Swallowing Evaluation Clinical swallow evaluation   Clinical Swallow Evaluation   Feeding Assistance dependent   Clinical Swallow Evaluation Textures Trialed thin liquids;pureed;soft & bite-sized   Clinical Swallow Eval: Thin Liquid Texture Trial   Mode of Presentation, Thin Liquids straw;fed by clinician   Volume of Liquid or Food Presented 2 ounces   Oral Phase of Swallow WFL   Pharyngeal Phase of Swallow intact   Diagnostic Statement Functional swallow with thin liquids. No overt s/sx of penetration/aspiration.   Clinical Swallow Evaluation: Puree Solid Texture Trial   Mode of Presentation, Puree spoon;fed by clinician   Volume of Puree Presented 3 ounces   Oral Phase, Puree delayed AP movement;residue in oral cavity   Oral Residue, Puree mid posterior tongue   Pharyngeal Phase, Puree intact   Diagnostic Statement Mild oral residue which cleared with liquid wash. No overt s/sx of penetration/aspiration.   Clinical Swallow Eval: Soft & Bite Sized   Mode of Presentation spoon;fed by clinician   Volume Presented 1 bite   Oral Phase impaired mastication;residue in oral cavity   Oral Residue  mid posterior tongue;left anterior lateral sulci;right anterior lateral sulci   Pharyngeal Phase intact   Diagnostic Statement Increased risk of aspiration with soft solids d/t significant pocketing and oral residue with cues needed to swallow.   Swallowing Recommendations   Diet Consistency Recommendations thin liquids (level 0);pureed (level 4)   Supervision Level for Intake 1:1 supervision needed   Mode of Delivery Recommendations bolus size, small   Monitoring/Assistance Required (Eating/Swallowing) check mouth frequently for oral residue/pocketing   Recommended Feeding/Eating Techniques (Swallow Eval) provide assist with feeding   Medication Administration Recommendations, Swallowing (SLP) Pills crushed (if able) and in food carrier   Comment, Swallowing Recommendations Patient presents with mild-moderate oropharyngeal dysphagia characterized by pocketing and significant oral residue with soft solids at times requiring cues to swallow. Recommend puree diet with thin liquids and pills crushed (if able) and in food carrier.   Western Aphasia Battery- Revised Bedside Record From   Spontaneous Speech Content Score (out of 10) 1   Spontaneous Speech Fluency Score (out of 10) 3   Auditory Verbal Comprehension Score (out of 10) 7   Sequential Commands Score (out of 10) 0   Repetition Score (out of 10) 3   Object Naming Score (out of 10) 10   Bedside Aphasia Sum 24   WAB-R Bedside Aphasia Score 40   Aphasia Severity Level Severe Aphasia   Comments Patient presents with severe mixed expressive/receptive language deficits characterized by inability to follow simple commands, inconsistent yes/no responses, and few spontaneous words.   General Therapy Interventions   Planned Therapy Interventions Dysphagia Treatment;Language   Dysphagia treatment Modified diet education;Instruction of safe swallow strategies   Language Auditory comprehension;Verbal expression   Clinical Impression   Criteria for Skilled Therapeutic  Interventions Met (SLP Eval) Yes, treatment indicated   SLP Diagnosis mild-moderate oropharyngeal dysphagia; severe mixed expressive/receptive language deficits   Clinical Impression Comments Patient presents with severe mixed expressive/receptive language deficits characterized by inability to follow simple commands, inconsistent yes/no responses, and few spontaneous words. Patient presents with mild-moderate oropharyngeal dysphagia characterized by pocketing and significant oral residue with soft solids at times requiring cues to swallow. Recommend puree diet with thin liquids and pills crushed (if able) and in food carrier.   SLP Total Evaluation Time   Eval: oral/pharyngeal swallow function, clinical swallow Minutes (53134) 50   Eval: Sound production with lang comprehension and expression Minutes (64023) 68   Therapy Certification   Start of Care Date 11/07/23   Certification date from 11/07/23   Certification date to 11/14/23   Medical Diagnosis mild-moderate oropharyngeal dysphagia; mixed expressive/receptive language deficits   SLP Goals   Therapy Frequency (SLP Eval) 3 times/week   SLP Predicted Duration/Target Date for Goal Attainment 11/14/23   SLP Goals Swallow;Communication   SLP: Safely tolerate diet without signs/symptoms of aspiration Easy to chew diet;Thin liquids   SLP: Communicate basic wants and needs minimal assist;verbally   SLP Discharge Planning   SLP Plan SLP will conitnue to follow during length of stay   SLP Discharge Recommendation Transitional Care Facility   SLP Rationale for DC Rec To increase functional communication and maximize safety with oral intake   SLP Brief overview of current status  puree diet;thin liquids   Total Session Time   Total Session Time (sum of timed and untimed services) 40

## 2023-11-07 NOTE — PROGRESS NOTES
"PRIMARY DIAGNOSIS: \"GENERIC\" NURSING  OUTPATIENT/OBSERVATION GOALS TO BE MET BEFORE DISCHARGE:  ADLs back to baseline: Yes    Activity and level of assistance: A2, w/c bound    Pain status: Improved with use of alternative comfort measures i.e.: positioning    Return to near baseline physical activity: No, pt. pocketing food     Discharge Planner Nurse   Safe discharge environment identified: Yes  Barriers to discharge: Yes, transferring to different facility for EEG and neurology.       Entered by: Symone Sanchez RN 11/07/2023 4:12 PM     Please review provider order for any additional goals.   Nurse to notify provider when observation goals have been met and patient is ready for discharge.  "

## 2023-11-07 NOTE — PROGRESS NOTES
Care Management Follow Up    Length of Stay (days): 0    Expected Discharge Date:       Concerns to be Addressed: discharge planning     Patient plan of care discussed at interdisciplinary rounds: Yes    Anticipated Discharge Disposition: Transitional Care     Anticipated Discharge Services: None  Anticipated Discharge DME: None    Patient/family educated on Medicare website which has current facility and service quality ratings: no  Education Provided on the Discharge Plan: Yes  Patient/Family in Agreement with the Plan: yes    Referrals Placed by CM/SW:    Private pay costs discussed: Not applicable    Additional Information:  Writer called and updated [facility] (Main Phone: [phone] Admissions Phone: [phone] Fax: [phone]) that patient is going to be transferred to a higher level of care when bed available.    Jackie Russ RN   Inpatient Care Coordinator  [facility] [phone]  [facility] [phone]     Jackie Russ RN

## 2023-11-07 NOTE — PROGRESS NOTES
[facility]      OUTPATIENT SPEECH LANGUAGE PATHOLOGY EVALUATION  PLAN OF TREATMENT FOR OUTPATIENT REHABILITATION  (COMPLETE FOR INITIAL CLAIMS ONLY)  Patient's Last Name, First Name, M.I.  YOB: 1955  Soila Meade                        Provider's Name  [facility] Medical Record No.  [id number]                               Onset Date:  11/05/23  Start of Care Date: 11/07/23    Type:     ___PT   ___OT   _X_SLP Medical Diagnosis: mild-moderate oropharyngeal dysphagia; mixed expressive/receptive language deficits          SLP Diagnosis:  mild-moderate oropharyngeal dysphagia; severe mixed expressive/receptive language deficits  Visits from SOC:  1   ________________________________________________________________  Plan of Treatment/Functional Goals    Planned Interventions:   Dysphagia Treatment, Language   Auditory comprehension, Verbal expression   Modified diet education, Instruction of safe swallow strategies        Goals: See Speech Language Pathology Goals on Care Plan in [facility] electronic health record.    Therapy Frequency:3 times/week   Predicted Duration of Therapy Intervention: 11/14/23  ________________________________________________________________________________    I CERTIFY THE NEED FOR THESE SERVICES FURNISHED UNDER        THIS PLAN OF TREATMENT AND WHILE UNDER MY CARE     (Physician attestation of this document indicates review and certification of the therapy plan).              Certification date from: 11/07/23 Certification date to: 11/14/23    Referring Physician: Katalina Marr MD           Initial Assessment        See Speech Language Pathology documentation in Epic electronic health record, evaluation dated  11/07/23     Please see note below for Ketoconazole.  Not on current medication list.  Last signed 7/20/17  Medication discontinued: 1/4/18 for reason stopped by patient.  Office visit note for that day doesn't address Ketoconazole.  Please advise.  Thank you.

## 2023-11-07 NOTE — PROGRESS NOTES
"PRIMARY DIAGNOSIS: \"GENERIC\" NURSING  OUTPATIENT/OBSERVATION GOALS TO BE MET BEFORE DISCHARGE:  ADLs back to baseline: Yes    Activity and level of assistance: wheelchair bound     Pain status: Improved with use of alternative comfort measures i.e.: positioning    Return to near baseline physical activity:  patient now pocketing food      Discharge Planner Nurse   Safe discharge environment identified: Yes transferring to a higher level of care  Barriers to discharge: No       Entered by: Sarahi Morillo RN 11/06/2023 10:17 PM     Please review provider order for any additional goals.   Nurse to notify provider when observation goals have been met and patient is ready for discharge.  "

## 2023-11-07 NOTE — PROGRESS NOTES
[facility]    Medicine Progress Note - Hospitalist Service    Date of Admission:  11/5/2023    Assessment & Plan     68-year-old female who presented to the ED on November 6 secondary to strokelike symptoms.  Patient's family noted left-sided facial droop along with difficulty speaking.  Her last known well time was sometime November 5.  She has a history of brain hemorrhage, cerebral aneurysms with clipping and  shunt.  Other chronic conditions include hypertension, chronic low back pain.     On admission a stroke code was activated at 1500, stroke neurology consulted, and de-escalated at 1534 head CT showed no acute findings, right frontal gliosis and encephalomalacia.  CTA of head and neck showed no acute findings.  Keppra level within normal limits.  Possible UTI with positive appearing UA, patient started on ceftriaxone, however urine culture revealed mixed pascual.  Slightly elevated WBC at 11.3 on admission and normal since admission.  Electrolytes within normal limits.      Aphasia   Dysphagia    Assessment: Patient communicating with me but difficult to know baseline completely.  Neurochecks have been stable.  The patient has been pocketing food.  Speech therapy assessed and placed her on a dysphagia diet.  No choking or coughing noted.  I did not see evidence of left sided facial droop this am.  We are having difficulty obtaining information regarding patient's  shunt, clipping and at this time unable to do an MRI safely.  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 * shunt catheter placed through a right frontal approach again  noted without change. No evidence for new or worsening hydrocephalus.  Otherwise, normal head CT  TTE done this admission revealed a normal LVEF  (hyperdynamic) and no valvular disease.    **The hospitalization discharge summary in which she suffered ruptured PATRICK aneurysm and subsequent clipping and  shunt placement, she did have an episode of encephalopathy and left facial droop after transferring out of ICU on that admission.  Head CT was stable a that time and because the patient did improve clinically on her own, no new repeat imaging or EEG were done.      Plan:   -Per stroke neuro okay to continue patient's prior to admission aspirin and Plavix  -Neurochecks every 4 hours  -Continue Keppra, Keppra level within normal limits  -continue speech therapy  -Awaiting transfer to higher level of care for EEG and Neurology consultation    Essential hypertension    Assessment: Prior to admission patient taking metoprolol, and lisinopril.  These were both held on admission.   to 152 systolic.  HR trending in the high 90's.    Plan:   Continue beta blocker and observe bp overnight    Former smoker    Assessment: Former smoker but currently using nicotine patch    Plan: Continue prior to admission nicotine patch  UTI  Assessment: On admission UA appeared positive and patient started on ceftriaxone on admission. Urine culture resulted on 11/7 revealed mixed pascual.    Plan;  -Repeat UA and if negative stop anbx  -continue rocephin for now            Diet: Pureed Diet (level 4) Thin Liquids (level 0)    DVT Prophylaxis: Pneumatic Compression Devices  Dominguez Catheter: Not present  Lines: None     Cardiac Monitoring: ACTIVE order. Indication: stroke  Code Status: Full Code                # Drug Induced Platelet Defect: home medication list includes an antiplatelet medication   # Hypertension: Noted on problem list          # Financial/Environmental Concerns: none         Disposition Plan           The patient's care was discussed with the  patient and the entire care team .    Dana Jin CNP  Hospitalist Service  [facility]  Center  Securely message with [employer] (more info)  Text page via [facility] Paging/Directory   ______________________________________________________________________    Interval History   Patient more alert as compared to previous reports this am    Physical Exam   Vital Signs: Temp: 98.7  F (37.1  C) Temp src: Oral BP: 133/84 Pulse: 116   Resp: 18 SpO2: 95 % O2 Device: None (Room air)    Weight: 133 lbs 13.11 oz    Gen:  Lying in bed no acute distress  HEENT:  normocephalic, atraumatic, oropharynx clear  Resp:  CTA posteriorly  Card:  S1,S2, RRR no murmur, rub or gallop  Abd:  Sof, non distended, non tender,  normoactive bowel sounds   Neuro:  Alert slow to respond, answers questions correctly, following commands, slightly left facial droop, aphasia at baseline, and weak grasp on RUE at baseline    Medical Decision Making     50 MINUTES SPENT BY ME on the date of service doing chart review, history, exam, documentation & further activities per the note.        Data         Imaging results reviewed over the past 24 hrs:   Recent Results (from the past 24 hour(s))   CT Head w/o Contrast    Narrative    CT OF THE HEAD WITHOUT CONTRAST November 6, 2023 2:15 PM     HISTORY: Aphasia.    TECHNIQUE: Axial CT images of the head from the skull base to the  vertex were acquired without IV contrast. Radiation dose for this scan  was reduced using automated exposure control, adjustment of the mA  and/or kV according to patient size, or iterative reconstruction  technique.    COMPARISON: Head CT 11/5/2023.    FINDINGS:  shunt catheter placed through a right frontal approach  with its tip near the foramen of Christy in the anterior aspect of the  left lateral ventricle again noted without change in position. Ex  vacuo dilatation of the frontal horns of the lateral ventricles  bilaterally again noted. No evidence for new or worsening  hydrocephalus. Chronic encephalomalacia of the anteromedial right  frontal lobe and genu of the corpus  callosum again noted. Gray-white  differentiation of the brain is otherwise normal.    There is no midline shift. There are no extra-axial fluid collections.      No intracranial hemorrhage, mass or recent infarct.    The visualized paranasal sinuses are well-aerated. There is no  mastoiditis. There are no fractures of the visualized bones.      Impression    IMPRESSION:  1. 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  2.  shunt catheter placed through a right frontal approach again  noted without change. No evidence for new or worsening hydrocephalus.  3. Otherwise, normal head CT.        ARNIE BAEZ MD         SYSTEM ID:  [id number]

## 2023-11-07 NOTE — DISCHARGE SUMMARY
[facility]  Hospitalist Discharge Summary      Date of Admission:  11/5/2023  Date of Discharge:  11/7/2023  Discharging Provider: Dana Jin CNP  Discharge Service: Hospitalist Service    Discharge Diagnoses   New onset aphasia/dysphagia    Clinically Significant Risk Factors          Follow-ups Needed After Discharge       Unresulted Labs Ordered in the Past 30 Days of this Admission       Date and Time Order Name Status Description    11/7/2023  1:44 PM Urine Culture In process         These results will be followed up by hospital service    Discharge Disposition   Transferred to higher level of care  Condition at discharge: Stable    Hospital Course   68-year-old female who presented to the ED on November 6 secondary to strokelike symptoms.  Patient's family noted new left-sided facial droop along with difficulty speaking.  Her last known well time was sometime November 5.  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  Other chronic conditions include hypertension, chronic low back pain.     On admission a stroke code was activated at 1500, stroke neurology consulted, and de-escalated at 1534 head CT showed no acute findings, right frontal gliosis and encephalomalacia.  CTA of head and neck showed no acute findings.  Keppra level within normal limits.  Possible UTI with positive appearing UA, patient started on ceftriaxone, however urine culture revealed mixed pascual.  Slightly elevated WBC at 11.3 on admission and normal since admission.  Electrolytes within normal limits.       Aphasia   Dysphagia   Patient communicating with me but difficult to know baseline completely.  Neurochecks have been stable.  The patient has been pocketing food.  Speech therapy assessed and placed her on a dysphagia diet.  No choking or coughing noted.  I did not see evidence of left sided facial droop this am.  We are having difficulty obtaining  information regarding patient's  shunt, clipping and at this time unable to do an MRI safely.  Repeat CT scan on 11/6 revealed stable head CT demonstrating: chronic encephalomalacia of the anteromedial right frontal lobe and genu of the corpus callosum  resulting in ex vacuo dilatation of the frontal horns of the lateral  ventricles bilaterally. * shunt catheter placed through a right frontal approach again  noted without change. No evidence for new or worsening hydrocephalus.  Otherwise, normal head CT  TTE done this admission revealed a normal LVEF (hyperdynamic) and no valvular disease.     **The hospitalization discharge summary was obtained in which she suffered ruptured PATRICK aneurysm and subsequent clipping and  shunt placement from [facility], she did have an episode of encephalopathy and left facial droop after transferring out of ICU on that admission.  Head CT was stable a that time and because the patient did improve clinically on her own, no new repeat imaging or EEG were done.      Plan:   -Per stroke neuro okay to continue patient's prior to admission aspirin and Plavix  -Neurochecks every 4 hours  -Continue Keppra, Keppra level within normal limits  -continue speech therapy  -Stroke Neurology recommending higher level of care for EEG monitoring, Neurology consultation (which are not available at this facility), and possible MRI.  -She will be transferring to [facility] this evening.       Essential hypertension    Assessment: Prior to admission patient taking metoprolol, and lisinopril.  These were both held on admission.   to 152 systolic.  HR trending in the high 90's.    Plan:   Resume beta blocker today.  Re-evaluate resuming lisinopril at [facility]     Former smoker    Assessment: Former smoker but currently using nicotine patch    Plan: Continue prior to admission nicotine patch  UTI  Assessment: On admission UA appeared positive and patient started on ceftriaxone on admission.  Urine culture resulted on 11/7 revealed mixed pascual.    Plan;  -Repeat UA and if negative stop anbx  -continue rocephin for now       Consultations This Hospital Stay   NEUROLOGY IP CONSULT  SPEECH LANGUAGE PATH ADULT IP CONSULT  NEUROLOGY IP STROKE CONSULT  CARE MANAGEMENT / SOCIAL WORK IP CONSULT    Code Status   Full Code    Time Spent on this Encounter   I, Dana Jin CNP, personally saw the patient today and spent greater than 30 minutes discharging this patient.       Dana Jin CNP  [facility]A MEDICAL SURGICAL  [address] [place] [address]  Phone: [phone]  ______________________________________________________________________    Physical Exam   Vital Signs: Temp: 98.7  F (37.1  C) Temp src: Oral BP: 133/84 Pulse: 116   Resp: 18 SpO2: 95 % O2 Device: None (Room air)    Weight: 133 lbs 13.11 oz    Gen:  Lying in bed no acute distress  HEENT:  normocephalic, atraumatic, oropharynx: pocketing food  Resp:  CTA posteriorly  Card:  S1,S2, RRR no murmur, rub or gallop  Abd:  Soft, non distended, non tender,  normoactive bowel sounds   Neuro:  Alert slow to respond, answers questions correctly, following commands, minimal left facial droop, aphasia at baseline, and weak grasp on RUE at baseline       Primary Care Physician   Physician No Ref-Primary    Discharge Orders   No discharge procedures on file.    Significant Results and Procedures   Most Recent 3 CBC's:  Recent Labs   Lab Test 11/06/23  0609 11/05/23  1542 09/05/23  1710   WBC 8.7 11.3* 7.4   HGB 12.3 12.2 12.1   MCV 88 89 85    426 267     Most Recent 3 BMP's:  Recent Labs   Lab Test 11/06/23  0609 11/05/23  1542 09/05/23  1710    136 142   POTASSIUM 3.8 4.1 3.7   CHLORIDE 100 99 104   CO2 23 26 25   BUN 16.2 18.3 10.3   CR 0.70 0.79 0.80   ANIONGAP 14 11 13   LUIS MANUEL 9.6 9.2 9.6   GLC 94 110* 97     Most Recent 2 LFT's:  Recent Labs   Lab Test 09/05/23  1710   AST 12   ALT 8   ALKPHOS 86   BILITOTAL 0.4   ,    Results for orders placed or performed during the hospital encounter of 11/05/23   CT Head w/o Contrast    Narrative    EXAM: CTA HEAD NECK W CONTRAST, CT HEAD W/O CONTRAST  LOCATION: [facility]  DATE: 11/5/2023    INDICATION: Code Stroke to evaluate for potential thrombolysis and thrombectomy. PLEASE READ IMMEDIATELY.  COMPARISON: Head CT dated 10/20/2023 and CTA dated 06/01/2023.   CONTRAST: 70mL, Isovue 370  TECHNIQUE: Head and neck CT angiogram with IV contrast. Noncontrast head CT followed by axial helical CT images of the head and neck vessels obtained during the arterial phase of intravenous contrast administration. Axial 2D reconstructed images and   multiplanar 3D MIP reconstructed images of the head and neck vessels were performed by the technologist. Dose reduction techniques were used. All stenosis measurements made according to NASCET criteria unless otherwise specified.    FINDINGS:   NONCONTRAST HEAD CT:   INTRACRANIAL CONTENTS: No acute intracranial hemorrhage, extraaxial collection, or midline shift. Redemonstration of a right frontal approach ventriculostomy catheter terminating at the left foramen of Monro. Changes related to prior left frontal   approach ventriculostomy catheter. When remeasured in a similar fashion, mildly increased size of the lateral and third ventricles with increased prominence of the temporal horns; for example, the maximum collective transverse dimension of the frontal   horns of the lateral ventricles measures 4.9 cm, previously 4.4 cm (series 2 image 13). Similar encephalomalacia involving the parasagittal/anterior frontal lobes, greater on the right. No evidence of an acute transcortical confluent infarct. Mild   presumed chronic small vessel ischemic changes. Mild generalized generalized cerebral parenchymal volume loss.    VISUALIZED ORBITS/SINUSES/MASTOIDS: No acute intraorbital finding. No evidence of significant paranasal sinus or  mastoid mucosal disease.     BONES/SOFT TISSUES: No acute abnormality.    HEAD CTA:  ANTERIOR CIRCULATION: No high-grade stenosis, occlusion, new aneurysm, or high-flow vascular malformation. Redemonstration of findings related to prior anterior cerebral artery stenting and A3 segment aneurysm embolization without evidence of residual   aneurysm. Unchanged 3 mm mm outpouching arising from the cavernous right ICA (series 6 image 399). Unchanged 3 mm outpouching arising from the right MCA bifurcation projecting posteriorly and slightly superiorly (series 6 image 424). Standard [place] of   Abel anatomy.    POSTERIOR CIRCULATION: No high-grade stenosis, occlusion, aneurysm, or high-flow vascular malformation. Balanced vertebral arteries.     DURAL VENOUS SINUSES: Not well evaluated on a technical basis.    NECK CTA:  RIGHT CAROTID: No hemodynamically significant stenosis or dissection. Similar mild mildly irregular predominantly noncalcified atherosclerotic plaque at the carotid bifurcation and proximal ICA.    LEFT CAROTID: No hemodynamically significant stenosis or dissection.    VERTEBRAL ARTERIES: Similar mild narrowing at the right vertebral artery origin. No focal high-grade stenosis or dissection. Balanced vertebral arteries.    AORTIC ARCH: Classic aortic arch anatomy with no significant stenosis at the origin of the great vessels. Similar mild narrowing involving the origin and proximal segment of the left subclavian artery due to predominantly noncalcified atherosclerotic   plaque.    NONVASCULAR STRUCTURES: Mild centrilobular pulmonary emphysema. 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 Multilevel spondylosis with moderate interbody degenerative change at C5-C7 and advanced left C4-C5 facet arthrosis. No severe spinal canal   stenosis. Moderate to severe multilevel foraminal narrowing.      Impression    IMPRESSION:   HEAD CT:  1.  No acute intracranial hemorrhage, extra-axial fluid collection, or  evidence of an acute transcortical infarct.  2.  Unchanged right frontal approach ventriculostomy catheter with slightly increased size of the supratentorial ventricles since 10/20/2023.  3.  Chronic changes, as described.    HEAD CTA:   1.  No large vessel occlusion or high-grade stenosis.  2.  Status post prior A3 segment left PATRICK stenting and aneurysm embolization without evidence of aneurysm recurrence.  3.  Unchanged 3 mm cavernous right ICA and MCA bifurcation aneurysms.    NECK CTA:  1.  No hemodynamically significant stenosis or dissection in the neck vessels.    Preliminary noncontrast head CT findings were discussed over the phone with Dr. Arriola on 11/05/2023 at 1520 CST.   CTA Head Neck with Contrast    Narrative    EXAM: CTA HEAD NECK W CONTRAST, CT HEAD W/O CONTRAST  LOCATION: [facility]  DATE: 11/5/2023    INDICATION: Code Stroke to evaluate for potential thrombolysis and thrombectomy. PLEASE READ IMMEDIATELY.  COMPARISON: Head CT dated 10/20/2023 and CTA dated 06/01/2023.   CONTRAST: 70mL, Isovue 370  TECHNIQUE: Head and neck CT angiogram with IV contrast. Noncontrast head CT followed by axial helical CT images of the head and neck vessels obtained during the arterial phase of intravenous contrast administration. Axial 2D reconstructed images and   multiplanar 3D MIP reconstructed images of the head and neck vessels were performed by the technologist. Dose reduction techniques were used. All stenosis measurements made according to NASCET criteria unless otherwise specified.    FINDINGS:   NONCONTRAST HEAD CT:   INTRACRANIAL CONTENTS: No acute intracranial hemorrhage, extraaxial collection, or midline shift. Redemonstration of a right frontal approach ventriculostomy catheter terminating at the left foramen of Monro. Changes related to prior left frontal   approach ventriculostomy catheter. When remeasured in a similar fashion, mildly increased size of the lateral and  third ventricles with increased prominence of the temporal horns; for example, the maximum collective transverse dimension of the frontal   horns of the lateral ventricles measures 4.9 cm, previously 4.4 cm (series 2 image 13). Similar encephalomalacia involving the parasagittal/anterior frontal lobes, greater on the right. No evidence of an acute transcortical confluent infarct. Mild   presumed chronic small vessel ischemic changes. Mild generalized generalized cerebral parenchymal volume loss.    VISUALIZED ORBITS/SINUSES/MASTOIDS: No acute intraorbital finding. No evidence of significant paranasal sinus or mastoid mucosal disease.     BONES/SOFT TISSUES: No acute abnormality.    HEAD CTA:  ANTERIOR CIRCULATION: No high-grade stenosis, occlusion, new aneurysm, or high-flow vascular malformation. Redemonstration of findings related to prior anterior cerebral artery stenting and A3 segment aneurysm embolization without evidence of residual   aneurysm. Unchanged 3 mm mm outpouching arising from the cavernous right ICA (series 6 image 399). Unchanged 3 mm outpouching arising from the right MCA bifurcation projecting posteriorly and slightly superiorly (series 6 image 424). Standard [place] of   Abel anatomy.    POSTERIOR CIRCULATION: No high-grade stenosis, occlusion, aneurysm, or high-flow vascular malformation. Balanced vertebral arteries.     DURAL VENOUS SINUSES: Not well evaluated on a technical basis.    NECK CTA:  RIGHT CAROTID: No hemodynamically significant stenosis or dissection. Similar mild mildly irregular predominantly noncalcified atherosclerotic plaque at the carotid bifurcation and proximal ICA.    LEFT CAROTID: No hemodynamically significant stenosis or dissection.    VERTEBRAL ARTERIES: Similar mild narrowing at the right vertebral artery origin. No focal high-grade stenosis or dissection. Balanced vertebral arteries.    AORTIC ARCH: Classic aortic arch anatomy with no significant stenosis at the  origin of the great vessels. Similar mild narrowing involving the origin and proximal segment of the left subclavian artery due to predominantly noncalcified atherosclerotic   plaque.    NONVASCULAR STRUCTURES: Mild centrilobular pulmonary emphysema. 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 Multilevel spondylosis with moderate interbody degenerative change at C5-C7 and advanced left C4-C5 facet arthrosis. No severe spinal canal   stenosis. Moderate to severe multilevel foraminal narrowing.      Impression    IMPRESSION:   HEAD CT:  1.  No acute intracranial hemorrhage, extra-axial fluid collection, or evidence of an acute transcortical infarct.  2.  Unchanged right frontal approach ventriculostomy catheter with slightly increased size of the supratentorial ventricles since 10/20/2023.  3.  Chronic changes, as described.    HEAD CTA:   1.  No large vessel occlusion or high-grade stenosis.  2.  Status post prior A3 segment left PATRICK stenting and aneurysm embolization without evidence of aneurysm recurrence.  3.  Unchanged 3 mm cavernous right ICA and MCA bifurcation aneurysms.    NECK CTA:  1.  No hemodynamically significant stenosis or dissection in the neck vessels.    Preliminary noncontrast head CT findings were discussed over the phone with Dr. Arriola on 11/05/2023 at 1520 CST.   CT Head w/o Contrast    Narrative    CT OF THE HEAD WITHOUT CONTRAST November 6, 2023 2:15 PM     HISTORY: Aphasia.    TECHNIQUE: Axial CT images of the head from the skull base to the  vertex were acquired without IV contrast. Radiation dose for this scan  was reduced using automated exposure control, adjustment of the mA  and/or kV according to patient size, or iterative reconstruction  technique.    COMPARISON: Head CT 11/5/2023.    FINDINGS:  shunt catheter placed through a right frontal approach  with its tip near the foramen of Christy in the anterior aspect of the  left lateral ventricle again noted without change in position.  Ex  vacuo dilatation of the frontal horns of the lateral ventricles  bilaterally again noted. No evidence for new or worsening  hydrocephalus. Chronic encephalomalacia of the anteromedial right  frontal lobe and genu of the corpus callosum again noted. Gray-white  differentiation of the brain is otherwise normal.    There is no midline shift. There are no extra-axial fluid collections.      No intracranial hemorrhage, mass or recent infarct.    The visualized paranasal sinuses are well-aerated. There is no  mastoiditis. There are no fractures of the visualized bones.      Impression    IMPRESSION:  1. 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  2.  shunt catheter placed through a right frontal approach again  noted without change. No evidence for new or worsening hydrocephalus.  3. Otherwise, normal head CT.        ARNIE BAEZ MD         SYSTEM ID:  [id number]   Echocardiogram Complete     Value    LVEF  >70%    Narrative    [id number]  [id number]  [id number]  [id number]^CRISTHIAN^JOSEP^ELSA     [facility]  Echocardiography Laboratory  [address] [place] [address]     Name: LES WELLS  MRN: [id number]  : 1955  Study Date: 2023 12:00 PM  Age: 68 yrs  Gender: Female  Patient Location: [facility]  Reason For Study: CVA  History: htn, tobacco, PATRICK aneurysm s/p clipping, ventriculostomy catheter  Ordering Physician: JOSEP MORROW  Performed By: Guillermina Alvarez     BSA: 1.7 m2  Height: 66 in  Weight: 132 lb  HR: 93  BP: 144/84 mmHg  ______________________________________________________________________________  Procedure  Complete Portable Echo Adult. Optison (NDC #[id number]) given intravenously.  Technically difficult  study.  ______________________________________________________________________________  Interpretation Summary     Technically difficult study.  Contrast administered for left ventricular opacification.  Normal left ventricular wall thickness.  Hyperdynamic left ventricular function.  The visual ejection fraction is >70%.  No regional wall motion abnormalities noted.  The right ventricle, atria and cardiac valves were not well visualized.  Grossly normal right ventricular size and systolic function.  No significant valve disease based on Doppler data.     There is no comparison study available.  ______________________________________________________________________________  Left Ventricle  The left ventricle is normal in size. There is normal left ventricular wall  thickness. Hyperdynamic left ventricular function. The visual ejection  fraction is >70%. Left ventricular diastolic function is not assessable. No  regional wall motion abnormalities noted.     Right Ventricle  The right ventricle is not well visualized. The right ventricular systolic  function is normal.     Atria  The left atrium is not well visualized. Right atrium not well visualized.     Mitral Valve  The mitral valve is not well visualized. There is trace mitral regurgitation.  There is no mitral valve stenosis.     Tricuspid Valve  The tricuspid valve is not well visualized. There is trace tricuspid  regurgitation. The right ventricular systolic pressure is approximated at 21.0  mmHg plus the right atrial pressure.     Aortic Valve  The aortic valve is trileaflet. The aortic valve is not well visualized. No  aortic regurgitation is present. No aortic stenosis is present.     Pulmonic Valve  The pulmonic valve is not well visualized. There is trace pulmonic valvular  regurgitation.     Vessels  The aortic root is normal size. Normal size ascending aorta. The inferior vena  cava is normal.     Pericardium  There is no pericardial effusion.      Rhythm  Sinus rhythm was noted.  ______________________________________________________________________________  MMode/2D Measurements & Calculations     IVSd: 0.87 cm  LVIDd: 4.0 cm  LVIDs: 2.6 cm  LVPWd: 0.78 cm  FS: 36.7 %  LV mass(C)d: 99.2 grams  LV mass(C)dI: 59.1 grams/m2  Ao root diam: 3.1 cm  LA dimension: 4.0 cm  asc Aorta Diam: 2.9 cm  LA/Ao: 1.3  Ao root diam index Ht(cm/m): 1.9  Ao root diam index BSA (cm/m2): 1.9  Asc Ao diam index BSA (cm/m2): 1.7  Asc Ao diam index Ht(cm/m): 1.7  RWT: 0.39     Doppler Measurements & Calculations  MV E max marek: 58.3 cm/sec  MV A max marek: 72.6 cm/sec  MV E/A: 0.80  MV dec time: 0.43 sec     Ao V2 max: 130.2 cm/sec  Ao max P.8 mmHg  LV V1 max P.8 mmHg  LV V1 max: 109.2 cm/sec  PA V2 max: 70.7 cm/sec  PA max P.0 mmHg  PA acc time: 0.11 sec  TR max marek: 229.3 cm/sec  TR max P.0 mmHg  AV Marek Ratio (DI): 0.84  Medial E/e': 11.6     ______________________________________________________________________________  Report approved by: Dr Lila Mosqueda 2023 12:54 PM             Discharge Medications   Current Discharge Medication List        CONTINUE these medications which have NOT CHANGED    Details   acetaminophen (TYLENOL) 325 MG tablet Take 650 mg by mouth every 8 hours as needed for pain      aspirin 81 MG EC tablet Take 81 mg by mouth daily      clopidogrel (PLAVIX) 75 MG tablet Take 75 mg by mouth daily Cerebral aneurysm, nonruptured      ferrous sulfate (FE TABS) 325 (65 Fe) MG EC tablet Take 325 mg by mouth daily      levETIRAcetam (KEPPRA) 100 MG/ML oral solution Take 10 mLs by mouth 2 times daily Cerebral aneurysm, nonruptured      lisinopril (ZESTRIL) 5 MG tablet Take 5 mg by mouth daily HOLD for systolic BP <110      melatonin 5 MG tablet Take 5 mg by mouth at bedtime      metoprolol tartrate (LOPRESSOR) 25 MG tablet Take 25 mg by mouth 2 times daily      nicotine (NICODERM CQ) 14 MG/24HR 24 hr patch Place 1 patch onto the skin every 24  hours      pantoprazole (PROTONIX) 40 MG EC tablet Take 40 mg by mouth daily      sennosides (SENOKOT) 8.6 MG tablet Take 1 tablet by mouth 2 times daily as needed for constipation      vitamin C (ASCORBIC ACID) 500 MG tablet Take 500 mg by mouth daily           Allergies   No Known Allergies

## 2023-11-07 NOTE — PROGRESS NOTES
"PRIMARY DIAGNOSIS: \"GENERIC\" NURSING  OUTPATIENT/OBSERVATION GOALS TO BE MET BEFORE DISCHARGE:  ADLs back to baseline: Yes    Activity and level of assistance: A2 w/c bound    Pain status: Improved with use of alternative comfort measures i.e.: positioning    Return to near baseline physical activity: No     Discharge Planner Nurse   Safe discharge environment identified: Yes  Barriers to discharge: Yes, needs tx to different facility for neurology and EEG.       Entered by: Fang Spencer RN 11/07/2023 5:09 AM     Pt lethargic overnight. Repositioning q2 hours. Adequate urine output. Purewick in place. Mepilex on coccyx for old wound.      Please review provider order for any additional goals.   Nurse to notify provider when observation goals have been met and patient is ready for discharge.  "

## 2023-11-08 NOTE — PLAN OF CARE
Speech Language Therapy Discharge Summary    Reason for therapy discharge:    Patient transferred to [place]    Progress towards therapy goal(s). See goals on Care Plan in [facility] electronic health record for goal details.  Goals not met.  Barriers to achieving goals:   Patient transferred from hospital.    Therapy recommendation(s):    Continued therapy is recommended.  Rationale/Recommendations:  To maximize safety with oral intake and improve functional communication.

## 2023-11-08 NOTE — PROGRESS NOTES
S-(situation):  Transfer to [facility] with all belongings.    B-(background): Pt admitted 11/5 for aphasia, left sided facial droop. Code stroke activated. No acute findings on CT. Pt started on antibiotics.     A-(assessment): Bilateral weakness in lower and upper extremities. Pt continued aphasia. Able to nod to answer questions. Was alert, but not able to answer orientation questions. Family at bedside stated this is not patient's baseline.    R-(recommendations): Transfer to [facility] per physician orders. Report called to Shana VIDALES RN at [facility] by Merle Sanchez RN. Family member  present at bedside in evening, but left before transfer. Continue to monitor pt and update physician as needed.      Code status: Full Code  Skin: excoriation on sacrum covered with mepilex, scattered rubs/scrapes  Fall Risk: Yes- Department fall risk interventions implemented.  Isolation: None  Medication drips upon transfer: None  Influenza status verified at discharge: No  Belongings gathered and given to paramedics.

## 2023-11-08 NOTE — PROGRESS NOTES
"PRIMARY DIAGNOSIS: \"GENERIC\" NURSING  OUTPATIENT/OBSERVATION GOALS TO BE MET BEFORE DISCHARGE:  ADLs back to baseline: Yes    Activity and level of assistance: A2, wheelchair bound    Pain status: Improved with use of alternative comfort measures i.e.: positioning    Return to near baseline physical activity: No, pt pocketing food     Discharge Planner Nurse   Safe discharge environment identified: Yes  Barriers to discharge: Yes, transferring to [facility] for EEG and neurology.       Entered by: Symone Sanchez RN 11/07/2023 6:53 PM   Bp at 1500 was 163/98 and tachycardic. No facial drooping noted. Equal strength on upper limbs. Pt on puree and thin liquid diet. Waiting for transport to [facility]. Nurse to Nurse report given to mercedes NGUYEN Daughter aware of the transfer.   Please review provider order for any additional goals.   Nurse to notify provider when observation goals have been met and patient is ready for discharge.  "

## 2023-11-17 PROBLEM — G91.9 HYDROCEPHALUS (H): Status: ACTIVE | Noted: 2023-01-01

## 2023-11-17 NOTE — ED NOTES
Pt moving left arm better. Purewik in place. Large incontinence of urine. Pt following commands. Will bend left knee with assist. Water given.

## 2023-11-17 NOTE — CONSULTS
[facility]    Stroke Telephone Note    I was called by Bri Kearns on 11/17/23 regarding patient Soila Meade. The patient is a 68 year old female with PMH of HTN, PATRICK aneurysm + ICH s/p clipping, on Keppra, R frontal approach ventriculostomy catheter, L SDH and baseline BLE weakness requiring wheelchair for mobility. In addition, she presented 11/4/23 for concerns of new L facial droop and speech changes; initial stroke workup was negative and she was ultimately transferred to [facility]--[place] [facility] [place] did not think symptoms were due to seizures, ultimately was felt symptom due to increasing hydrocephalus and shunt was reprogrammed by neurosurgery.      Today she presents for concern of L sided facial droop, possible worsening RUE weakness. Per ED provider no other clearly new deficits on exam. /83.    Of note, she also had transient L sided weakness during admission in Aug 2023; workup also negative for stroke at that time.    Vitals  BP: 139/83   Pulse: 64   Resp: 18   Temp: 97.4  F (36.3  C)        Stroke Code Data (for stroke code without tele)  Stroke code activated 11/17/23  1404   Stroke provider first response 11/17/23  1409   Last known normal 11/17/23  1300      Time of discovery (or onset of symptoms) 11/17/23  1300   Head CT read by Stroke Neuro Provider 11/17/23  1410   Was stroke code de-escalated? Yes  11/17/23  1431     Imaging Findings  CT head: concerning for developing/interval worsening of hydrocephalus   CTA head/neck: no LVO; prior PATRICK stenting and A3 segment aneurysm embolization, stable 3mm aneurysm of R ICA    Intravenous Thrombolysis  Not given due to:   - history of intracranial hemorrhage  - minor/isolated/quickly resolving symptoms    Endovascular Treatment  Not initiated due to absence of proximal vessel occlusion    Impression  L facial droop, RUE weakness. Unclear etiology; patient has had similar transient symptoms  "multiple times in the past. CT concerning for worsening hydrocephalus.    Recommendations   -consult neurosurgery for further recommendations    Case discussed with vascular neurology attending Dr. Glasgow.    My recommendations are based on the information provided over the phone by Soila Meade's in-person providers. They are not intended to replace the clinical judgment of her in-person providers. I was not requested to personally see or examine the patient at this time.    My recommendations are based on the information provided over the phone by Soila Meade's in-person providers. They are not intended to replace the clinical judgment of her in-person providers. I was not requested to personally see or examine the patient at this time.    HUMERA Marshall CNP  Vascular Neurology    To page me or covering stroke neurology team member, click here: [facility]COM  Choose \"On Call\" tab at top, then select \"NEUROLOGY/ALL SITES\" from middle drop-down box, press Enter, then look for \"stroke\" or \"telestroke\" for your site.   "

## 2023-11-17 NOTE — ED TRIAGE NOTES
Teir 1 code stoke called.  Pt with hx stroke,TIA and  shunt.  Left sided residual facial dropping at baseline worse today with increased weakness x 1 hour.      Triage Assessment (Adult)       Row Name 11/17/23 1404          Triage Assessment    Airway WDL WDL        Respiratory WDL    Respiratory WDL WDL        Cardiac WDL    Cardiac WDL WDL

## 2023-11-17 NOTE — MEDICATION SCRIBE - ADMISSION MEDICATION HISTORY
Medication Scribe Admission Medication History    Admission medication history is complete. The information provided in this note is only as accurate as the sources available at the time of the update.    Information Source(s): Facility (U/NH/) medication list/MAR via N/A    Pertinent Information: MAR from [facility]    Changes made to PTA medication list:  Added: miralax  Deleted: rocephin IM  Changed: metoprolol dose, nicoderm strength    Medication Affordability:  Not including over the counter (OTC) medications, was there a time in the past 3 months when you did not take your medications as prescribed because of cost?: Unable to Assess    Allergies reviewed with patient and updates made in EHR: unable to assess    Medication History Completed By: HERMILO JAMES 11/17/2023 2:51 PM    PTA Med List   Medication Sig Last Dose    acetaminophen (TYLENOL) 325 MG tablet Take 650 mg by mouth every 8 hours as needed for pain 11/17/2023 at 1222    aspirin 81 MG EC tablet Take 81 mg by mouth daily 11/17/2023 at 0921    clopidogrel (PLAVIX) 75 MG tablet Take 75 mg by mouth daily Cerebral aneurysm, nonruptured 11/17/2023 at 0921    ferrous sulfate (FE TABS) 325 (65 Fe) MG EC tablet Take 325 mg by mouth daily 11/17/2023 at 0921    levETIRAcetam (KEPPRA) 100 MG/ML oral solution Take 10 mLs by mouth 2 times daily Cerebral aneurysm, nonruptured 11/17/2023 at 0921    lisinopril (ZESTRIL) 5 MG tablet Take 5 mg by mouth daily HOLD for systolic BP <110 11/17/2023 at 0921    melatonin 5 MG tablet Take 5 mg by mouth at bedtime 11/16/2023 at 2305    metoprolol tartrate (LOPRESSOR) 25 MG tablet Take 75 mg by mouth 2 times daily With food 11/17/2023 at 0921    nicotine (NICODERM CQ) 21 MG/24HR 24 hr patch Place 1 patch onto the skin every 24 hours 11/17/2023 at 0921    pantoprazole (PROTONIX) 40 MG EC tablet Take 40 mg by mouth daily 11/17/2023 at 0921    polyethylene glycol (MIRALAX) 17 g packet Take 17 g by mouth daily as needed for  constipation  at not started    vitamin C (ASCORBIC ACID) 500 MG tablet Take 500 mg by mouth daily 11/17/2023 at 0921

## 2023-11-17 NOTE — ED PROVIDER NOTES
History     Chief Complaint   Patient presents with    Facial Droop     HPI  Soila Meade is a 68 year old female presenting from care facility secondary to concerns of left facial droop and worsened right arm weakness.  Patient on arrival through EMS with a sugar of 103.  Noted symptoms started approximately 1 hour ago.  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  Patient also has an obstructive hydrocephalus and was recently admitted on 11 8/23 for facial droop episode.  Patient does have a  shunt and noted facility stated patient is often presents with symptomatology similar to today's presentation and if  shunt becomes obstructed.    Allergies:  No Known Allergies    Problem List:    Patient Active Problem List    Diagnosis Date Noted    Aphasia 11/05/2023     Priority: Medium    Former smoker 01/20/2020     Priority: Medium     Formatting of this note might be different from the original. Quit 2018. 1/2 ppd x 10 yr.      Chronic midline low back pain without sciatica 03/06/2018     Priority: Medium    Osteopenia 01/24/2017     Priority: Medium    Essential hypertension 06/06/2016     Priority: Medium    Degeneration of cervical intervertebral disc 10/15/2013     Priority: Medium    Personal history of colonic polyps 02/05/2013     Priority: Medium    Insomnia 05/29/2012     Priority: Medium        Past Medical History:    Past Medical History:   Diagnosis Date    Chronic midline low back pain without sciatica 03/06/2018    Degeneration of cervical intervertebral disc 10/15/2013    Essential hypertension 06/06/2016    Former smoker 01/20/2020    Insomnia 05/29/2012    Osteopenia 01/24/2017     Personal history of colonic polyps 02/05/2013       Past Surgical History:    No past surgical history on file.    Family History:    No family history on file.    Social History:  Marital Status:  Unknown [6]        Medications:    acetaminophen (TYLENOL) 325 MG tablet  aspirin 81 MG EC tablet  clopidogrel (PLAVIX) 75 MG tablet  ferrous sulfate (FE TABS) 325 (65 Fe) MG EC tablet  levETIRAcetam (KEPPRA) 100 MG/ML oral solution  lisinopril (ZESTRIL) 5 MG tablet  melatonin 5 MG tablet  metoprolol tartrate (LOPRESSOR) 25 MG tablet  nicotine (NICODERM CQ) 21 MG/24HR 24 hr patch  pantoprazole (PROTONIX) 40 MG EC tablet  polyethylene glycol (MIRALAX) 17 g packet  vitamin C (ASCORBIC ACID) 500 MG tablet  sennosides (SENOKOT) 8.6 MG tablet          Review of Systems    Physical Exam   BP: 139/83  Pulse: 64  Temp: 97.4  F (36.3  C)  Resp: 18  SpO2: 97 %      Physical Exam    ED Course                 Procedures              EKG Interpretation:      Interpreted by Bri Kearns MD  Time reviewed: 1439  Symptoms at time of EKG: Stroke concerns  Rhythm: normal sinus   Rate: normal  Axis: normal  Ectopy: none  Conduction: normal  ST Segments/ T Waves: No ST-T wave changes  Q Waves: none  Comparison to prior: Unchanged    Clinical Impression: normal EKG      Critical Care time:  was 35 minutes for this patient excluding procedures.           Results for orders placed or performed during the hospital encounter of 11/17/23 (from the past 24 hour(s))   Glucose by meter   Result Value Ref Range    GLUCOSE BY METER POCT 94 70 - 99 mg/dL   CT Head w/o Contrast    Narrative    EXAM: CT HEAD W/O CONTRAST  11/17/2023 2:10 PM     HISTORY: Code Stroke to evaluate for potential thrombolysis and  thrombectomy. PLEASE READ IMMEDIATELY.       COMPARISON: Head CT: 11/6/2023 and 11/5/2023.    TECHNIQUE: Using multidetector thin collimation helical acquisition  technique, axial, coronal and sagittal CT images from the skull base  to the  vertex were obtained without intravenous contrast.   (topogram) image(s) also obtained and reviewed.    Radiation dose for this scan was reduced using automated exposure  control, adjustment of the mA and/or kV according to patient size, or  iterative reconstruction technique.    FINDINGS:    Calvarium/skull base: No acute calvarial fracture. No destructive  osseous lesions.  Mastoids and middle ears are clear. Bifrontal jc  holes. Right frontal approach ventriculostomy catheter. Imaged  portions of the shunt catheter tubing appear intact. Similar small  piece of shunt catheter tubing along the anterior interhemispheric  fissure.    Orbits: Imaged portions are within normal limits.    Paranasal sinuses: No substantial paranasal sinus disease.    Brain: No acute intracranial hemorrhage.  No evidence of acute large  vascular territory ischemic infarct.  Similar areas of bifrontal  encephalomalacia, right greater than left. Similar positioning of  right frontal approach ventriculostomy catheter with distal tip  crossing midline and terminating in the left frontal horn. In  comparison with studies performed earlier this month, there has been a  progressive interval increase in size of the ventricular system, most  pronounced in the frontal horns and temporal horns. The frontal horns  measure 52 mm in transverse diameter, previously 47 mm measured  retrospectively. No convincing evidence of significant transependymal  edema at this time. Similar background of mild chronic microvascular  ischemic disease. Mild generalized cerebral parenchymal volume loss.  Basal cisterns are patent.      Impression    IMPRESSION:    1.  Findings concerning for developing hydrocephalus, further  described above. No convincing evidence of transependymal edema at  this time. Similar positioning of right frontal approach  ventriculostomy catheter. Imaged portions of the shunt catheter tubing  appear intact.  2.  No acute intracranial  hemorrhage or evidence of acute large  vascular territory infarct.  3.  Additional chronic changes as described above.    Findings were discussed via telephone with Dr. Kearns by myself  at approximately 2:20 PM CST on 11/17/2023.    MILADY FOUNTAIN MD         SYSTEM ID:  [id number]   CTA Head Neck with Contrast    Narrative    EXAM: CTA HEAD NECK W CONTRAST  11/17/2023 2:30 PM     HISTORY: Code Stroke to evaluate for potential thrombolysis and  thrombectomy. PLEASE READ IMMEDIATELY.       COMPARISON: Head and neck CTA: 11/5/2023.    TECHNIQUE:  HEAD and NECK CTA: During rapid bolus intravenous injection of  nonionic contrast material, axial images were obtained using thin  collimation multidetector helical technique from the base of the upper  aortic arch through the [place]. This CT angiogram data was  reconstructed at thin intervals with mild overlap. Images were sent to  the 3D workstation, and 3D reconstructions were obtained. The axial  source images, multiplanar reformations, 3D reconstructions in both  maximum intensity projection display and volume rendered models were  reviewed, with reconstructions performed by the technologist.    Radiation dose for this scan was reduced using automated exposure  control, adjustment of the mA and/or kV according to patient size, or  iterative reconstruction technique    CONTRAST: ISOVUE-370 70mL    FINDINGS:    NECK CTA:    Aortic arch: Normal configuration of the aortic arch. No high-grade  stenosis at the origins of the major branch vessels.    Left carotid artery: No high-grade stenosis or occlusion. No evidence  of dissection.    Right carotid artery: No high-grade stenosis or occlusion. Similar  mild mixed atherosclerotic plaque about the carotid bifurcation. No  evidence of dissection.    Vertebral arteries: Codominant. No high-grade stenosis or occlusion.  No evidence of dissection.    HEAD CTA:      Anterior circulation: No high-grade stenosis or  occlusion. Similar  changes related to prior PATRICK stenting and A3 segment aneurysm  embolization without evidence of residual/recurrent aneurysm. Similar  3 mm superior/laterally directed outpouching arising from the  cavernous right ICA (series 7/image 380). Similar 3 mm outpouching  arising from the right MCA bifurcation, which projects  posteriorly/superiorly (series 7/image 329). No new aneurysms. No  evidence of high flow vascular malformation. Conventional [place] anatomy.    Posterior circulation: No high-grade stenosis or occlusion. No  aneurysm. No evidence of high flow vascular malformation.    Venous structures: Grossly patent.     Additional findings: Centrilobular pulmonary emphysema. Multilevel  cervical spondylosis, unchanged.      Impression    IMPRESSION:    HEAD CTA:  1.  No high-grade stenosis or occlusion involving the major  intracranial arteries.  2.  Changes of prior PATRICK stenting and A3 segment aneurysm  embolization. No evidence of aneurysm recurrence.  3.  Unchanged 3 mm aneurysms involving the cavernous right ICA and at  the right MCA bifurcation.    NECK CTA:  1.  No high-grade stenosis or occlusion involving the major arteries  of the neck.  2.  No evidence of dissection.    MILADY FOUNTAIN MD         SYSTEM ID:  [id number]   CBC with Platelets & Differential    Narrative    The following orders were created for panel order CBC with Platelets & Differential.  Procedure                               Abnormality         Status                     ---------                               -----------         ------                     CBC with platelets and d...[[id number]]                      Final result                 Please view results for these tests on the individual orders.   Basic metabolic panel   Result Value Ref Range    Sodium 134 (L) 135 - 145 mmol/L    Potassium 3.9 3.4 - 5.3 mmol/L    Chloride 99 98 - 107 mmol/L    Carbon Dioxide (CO2) 22 22 - 29 mmol/L    Anion Gap  13 7 - 15 mmol/L    Urea Nitrogen 13.0 8.0 - 23.0 mg/dL    Creatinine 0.75 0.51 - 0.95 mg/dL    GFR Estimate 86 >60 mL/min/1.73m2    Calcium 9.1 8.8 - 10.2 mg/dL    Glucose 92 70 - 99 mg/dL   INR   Result Value Ref Range    INR 1.12 0.85 - 1.15   Partial thromboplastin time   Result Value Ref Range    aPTT 26 22 - 38 Seconds   Troponin T, High Sensitivity   Result Value Ref Range    Troponin T, High Sensitivity 12 <=14 ng/L   CBC with platelets and differential   Result Value Ref Range    WBC Count 5.8 4.0 - 11.0 10e3/uL    RBC Count 4.52 3.80 - 5.20 10e6/uL    Hemoglobin 13.1 11.7 - 15.7 g/dL    Hematocrit 40.1 35.0 - 47.0 %    MCV 89 78 - 100 fL    MCH 29.0 26.5 - 33.0 pg    MCHC 32.7 31.5 - 36.5 g/dL    RDW 13.2 10.0 - 15.0 %    Platelet Count 281 150 - 450 10e3/uL    % Neutrophils 60 %    % Lymphocytes 27 %    % Monocytes 9 %    % Eosinophils 2 %    % Basophils 1 %    % Immature Granulocytes 1 %    NRBCs per 100 WBC 0 <1 /100    Absolute Neutrophils 3.6 1.6 - 8.3 10e3/uL    Absolute Lymphocytes 1.5 0.8 - 5.3 10e3/uL    Absolute Monocytes 0.5 0.0 - 1.3 10e3/uL    Absolute Eosinophils 0.1 0.0 - 0.7 10e3/uL    Absolute Basophils 0.0 0.0 - 0.2 10e3/uL    Absolute Immature Granulocytes 0.0 <=0.4 10e3/uL    Absolute NRBCs 0.0 10e3/uL       Medications   iopamidol (ISOVUE-370) solution 500 mL (70 mLs Intravenous $Given 11/17/23 1414)   sodium chloride 0.9 % bag 100mL for CT scan flush use (100 mLs Intravenous $Given 11/17/23 1414)       Assessments & Plan (with Medical Decision Making)     I have reviewed the nursing notes.    I have reviewed the findings, diagnosis, plan and need for follow up with the patient.          Medical Decision Making  68-year-old female presenting from care facility with concerns of stroke.  Patient has a history of HTN, b/l PE, prior SAH, PATRICK aneurysm and obstructive hydrocephalus history with a  shunt in place.  Patient recently admitted on 11/5 for similar symptoms and transferred to  have neurology evaluate  shunt which did show some hydrocephalus on her MRIs.  She has a 1 month follow-up which is early next month.  At this time patient does have some mild left facial droop she is nonverbal when prompted to discuss symptoms she is also noting no significant changes in her sensory with shaking her head no therefore difficult to assess NIHSS score as patient has difficulty following commands including visual field testing, range of motion of eyes, cerebellar testing, and extinction.  At this time we will keep as a tier 1 stroke secondary to noted acute changes from nursing staff and will complete CT of head with contrast.  Discussed case with neurology who recommends continuation with tier 1 at this time as well.    CT head showing increased ventricular size likely secondary to obstruction of the  shunt.  Discussed case with radiology and neurology and de-escalated stroke.  At this time recommended neurosurgery consult.  Discussed case with neurosurgery  through [facility] however no bed availability therefore discussed case with [facility] hospitalist Dr. Rajput who is in agreement at this time.  Will transfer patient to [facility].  Otherwise lab work otherwise unremarkable for any acute signs of infection anemia is or cardiac pathology.  Patient's family notified in agreement.  Transfer initiated.        New Prescriptions    No medications on file       Final diagnoses:   Obstructive hydrocephalus (H)   History of stroke   Dysphagia, unspecified type   Aphasia   Left-sided weakness       11/17/2023   [facility] EMERGENCY DEPT       Bri Kearns MD  11/17/23 7911

## 2023-11-17 NOTE — ED NOTES
Pt is resting. Pt awake and alert. Pt will answer questions when asked. Family at bedside. Chronic left sided weakness.

## 2023-11-18 PROBLEM — G93.49 ENCEPHALOPATHY DUE TO STRUCTURAL DISORDER OF BRAIN: Status: ACTIVE | Noted: 2023-01-01

## 2023-11-18 NOTE — H&P
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  Transferred to [facility] on 11/17/2023.     Recurrent left facial droop  Hx of aneurysmal SAH with large intraparenchymal hemorrhage cingulate gyri secondary to ruptured pericallosal left PATRICK aneurysm status post A3 clipping 3/2023 and [employer] device 8/2023   Hx of obstructive hydrocephalus s/p  shunt placement 6/9/23  SDH secondary to over-draining shunt 7/2023  Chronic left hemiparesis   *Presents to with nonverbal episode, recurrent left facial droop, reported R arm weakness.   *CT head with finding concerning for developing hydrocephalus, no convincing evidence of trans ependymal edema. CTA head/neck with no high-grade stenosis or occlusion, changes of prior stenting with no evidence of aneurysm recurrence, unchanged 3 mm aneurysms involving cavernous right ICA and at right MCA bifurcation  *Transferred for NSG evaluation. Attempted transfer to [facility] where she had her prior NSG care, no bed available.   *Recently admitted at [facility] 11/7-11/13/23 for similar presentation, ultimately found to have worsening hydrocephalus with shunt reprogrammed from 1.5 to 1.0 (previously increased from 1.0 to 1.5 8/2023 due to shunt overdrainage)  *On arrival able to answer basic questions (name, \"hospital\" as location). Persistent left facial droop. Following basic commands, not complex commands. Not obtunded. Pupils normal.    - Neurosurgery consulted  - Shunt " malfunction XR series  - Continue prior to admission levetiracetam (IV while NPO)  - NPO pending speech eval, SLP consulted  - Resume prior to admission aspirin, clopidogrel when cleared for PO    Hypertension  - Resume prior to admission lisinopril, metoprolol when cleared for PO    GERD  - Continue prior to admission PPI IV/PO    Hx of PE s/p IVC filter placement  Not on anticoagulation presently. Had temporary IVC filter placed 4/2023, subsequently removed when able to start AC, subsequently replaced again 8/2023 due to SDH above, do not see notes/procedure indicating has been removed again.     - Enoxaparin subcutaneous ppx     Tobacco use disorder  Currently on nicotine patch PTA.  - Nicotine replacement ordered         Diet: NPO for Medical/Clinical Reasons Except for: No Exceptions   DVT Prophylaxis: Enoxaparin subcutaneous   Dominguez Catheter: Not present  Code Status: Full Code - Per POLST    Disposition Plan   Admit to inpatient. Anticipate greater than or equal to 2 midnights prior to discharge.     Entered: Jewel Vizcarra MD 11/18/2023, 1:13 AM     The patient's care was discussed with the patient and bedside RN       Clinically Significant Risk Factors Present on Admission                # Drug Induced Platelet Defect: home medication list includes an antiplatelet medication   # Hypertension: Noted on problem list          # Financial/Environmental Concerns:                Jewel Vizcarra MD  [facility]    ______________________________________________________________________    Chief Complaint   Left facial droop, non-verbal episode     History of Present Illness   Soila Meade is a 68 year old female who presents with the above chief complaint.    History is obtained from the patient and review of medical record. History from patient limited due to aphasia. She presented to [facility] from her care facility with an episode of nonverbal status, left facial droop and  reported right arm weakness.  Extensive neurologic history as detailed in assessment and plan.  Imaging concerning for developing hydrocephalus, transferred to [facility] for further neurosurgical evaluation as no bed was available in the [facility] system where she has received all of her neurosurgical care previously.  On arrival, she is able to state her name and that she is in the hospital, though not able to state which hospital, otherwise minimal responses. Denies any pain at present.     Past Medical History    I have reviewed this patient's medical history and updated it with pertinent information if needed.   Past Medical History:   Diagnosis Date    Cerebral aneurysm rupture (H)     Chronic midline low back pain without sciatica 03/06/2018    Degeneration of cervical intervertebral disc 10/15/2013    Depression     Essential hypertension 06/06/2016    Former smoker 01/20/2020    Formatting of this note might be different from the original. Quit 2018. 1/2 ppd x 10 yr.    Hydrocephalus (H)     s/p  shunt    Insomnia 05/29/2012    Left hemiparesis (H)     Osteopenia 01/24/2017    Personal history of colonic polyps 02/05/2013    SDH (subdural hematoma) (H)     SDH (subdural hematoma) (H)     Secondary to shunt over-drainage       Past Surgical History   I have reviewed this patient's surgical history and updated it with pertinent information if needed.  Past Surgical History:   Procedure Laterality Date    left foot bunionectomy and hammertoe repair      wisdom teeth extraction           Social History   I have reviewed this patient's social history and updated it with pertinent information if needed.  Social History     Tobacco Use    Smoking status: Former     Types: Cigarettes   Substance Use Topics    Alcohol use: Not Currently           Family History   I have reviewed this patient's family history and updated it with pertinent information if needed.   Family History   Problem Relation Age of Onset     Lymphoma Mother     Bone Cancer Father     Diabetes Father     Hypertension Brother         Prior to Admission Medications    Prior to Admission Medications   Prescriptions Last Dose Informant Patient Reported? Taking?   acetaminophen (TYLENOL) 325 MG tablet   Yes No   Sig: Take 650 mg by mouth every 8 hours as needed for pain   aspirin 81 MG EC tablet   Yes No   Sig: Take 81 mg by mouth daily   clopidogrel (PLAVIX) 75 MG tablet   Yes No   Sig: Take 75 mg by mouth daily Cerebral aneurysm, nonruptured   ferrous sulfate (FE TABS) 325 (65 Fe) MG EC tablet   Yes No   Sig: Take 325 mg by mouth daily   levETIRAcetam (KEPPRA) 100 MG/ML oral solution   Yes No   Sig: Take 10 mLs by mouth 2 times daily Cerebral aneurysm, nonruptured   lisinopril (ZESTRIL) 5 MG tablet   Yes No   Sig: Take 5 mg by mouth daily HOLD for systolic BP <110   melatonin 5 MG tablet   Yes No   Sig: Take 5 mg by mouth at bedtime   metoprolol tartrate (LOPRESSOR) 25 MG tablet   Yes No   Sig: Take 75 mg by mouth 2 times daily With food   nicotine (NICODERM CQ) 21 MG/24HR 24 hr patch   Yes No   Sig: Place 1 patch onto the skin every 24 hours   pantoprazole (PROTONIX) 40 MG EC tablet   Yes No   Sig: Take 40 mg by mouth daily   polyethylene glycol (MIRALAX) 17 g packet   Yes No   Sig: Take 17 g by mouth daily as needed for constipation   sennosides (SENOKOT) 8.6 MG tablet   Yes No   Sig: Take 1 tablet by mouth 2 times daily as needed for constipation   vitamin C (ASCORBIC ACID) 500 MG tablet   Yes No   Sig: Take 500 mg by mouth daily      Facility-Administered Medications: None     Allergies   No Known Allergies    Physical Exam   Vital Signs: Temp: 99.7  F (37.6  C) Temp src: Oral BP: (!) 167/102 Pulse: 85   Resp: 16 SpO2: 96 %      Weight: 0 lbs 0 oz    Constitutional: NAD  Respiratory: Clear to auscultation bilaterally, good air movement, normal effort   Cardiovascular: RRR, no m/r/g. No peripheral edema.   GI: Soft, non-tender, non-distended. No rebound  "tenderness or guarding.    Skin: Warm, dry   Neurologic: Alert. Able to state name and \"hospital\", not specific one. Not able to state reason for hospital and otherwise responds yes/no intermittently. Able to follow simple commands intermittently, not completing complex commands. L sided weakness upper and lower extremity compared to right. Left facial droop.   Psychiatric: Normal affect, appropriate      Data   Data reviewed today: I reviewed all medications, new labs and imaging results over the last 24 hours. I personally reviewed the EKG tracing showing sinus rhythm .    Recent Labs   Lab 11/17/23  1431 11/17/23  1402   WBC 5.8  --    HGB 13.1  --    MCV 89  --      --    INR 1.12  --    *  --    POTASSIUM 3.9  --    CHLORIDE 99  --    CO2 22  --    BUN 13.0  --    CR 0.75  --    ANIONGAP 13  --    LUIS MANUEL 9.1  --    GLC 92 94       Recent Results (from the past 24 hour(s))   CT Head w/o Contrast    Narrative    EXAM: CT HEAD W/O CONTRAST  11/17/2023 2:10 PM     HISTORY: Code Stroke to evaluate for potential thrombolysis and  thrombectomy. PLEASE READ IMMEDIATELY.       COMPARISON: Head CT: 11/6/2023 and 11/5/2023.    TECHNIQUE: Using multidetector thin collimation helical acquisition  technique, axial, coronal and sagittal CT images from the skull base  to the vertex were obtained without intravenous contrast.   (topogram) image(s) also obtained and reviewed.    Radiation dose for this scan was reduced using automated exposure  control, adjustment of the mA and/or kV according to patient size, or  iterative reconstruction technique.    FINDINGS:    Calvarium/skull base: No acute calvarial fracture. No destructive  osseous lesions.  Mastoids and middle ears are clear. Bifrontal jc  holes. Right frontal approach ventriculostomy catheter. Imaged  portions of the shunt catheter tubing appear intact. Similar small  piece of shunt catheter tubing along the anterior " interhemispheric  fissure.    Orbits: Imaged portions are within normal limits.    Paranasal sinuses: No substantial paranasal sinus disease.    Brain: No acute intracranial hemorrhage.  No evidence of acute large  vascular territory ischemic infarct.  Similar areas of bifrontal  encephalomalacia, right greater than left. Similar positioning of  right frontal approach ventriculostomy catheter with distal tip  crossing midline and terminating in the left frontal horn. In  comparison with studies performed earlier this month, there has been a  progressive interval increase in size of the ventricular system, most  pronounced in the frontal horns and temporal horns. The frontal horns  measure 52 mm in transverse diameter, previously 47 mm measured  retrospectively. No convincing evidence of significant transependymal  edema at this time. Similar background of mild chronic microvascular  ischemic disease. Mild generalized cerebral parenchymal volume loss.  Basal cisterns are patent.      Impression    IMPRESSION:    1.  Findings concerning for developing hydrocephalus, further  described above. No convincing evidence of transependymal edema at  this time. Similar positioning of right frontal approach  ventriculostomy catheter. Imaged portions of the shunt catheter tubing  appear intact.  2.  No acute intracranial hemorrhage or evidence of acute large  vascular territory infarct.  3.  Additional chronic changes as described above.    Findings were discussed via telephone with Dr. Kearns by myself  at approximately 2:20 PM CST on 11/17/2023.    MILADY FOUNTAIN MD         SYSTEM ID:  [id number]   CTA Head Neck with Contrast    Narrative    EXAM: CTA HEAD NECK W CONTRAST  11/17/2023 2:30 PM     HISTORY: Code Stroke to evaluate for potential thrombolysis and  thrombectomy. PLEASE READ IMMEDIATELY.       COMPARISON: Head and neck CTA: 11/5/2023.    TECHNIQUE:  HEAD and NECK CTA: During rapid bolus intravenous injection  of  nonionic contrast material, axial images were obtained using thin  collimation multidetector helical technique from the base of the upper  aortic arch through the [place] of [place]. This CT angiogram data was  reconstructed at thin intervals with mild overlap. Images were sent to  the 3D workstation, and 3D reconstructions were obtained. The axial  source images, multiplanar reformations, 3D reconstructions in both  maximum intensity projection display and volume rendered models were  reviewed, with reconstructions performed by the technologist.    Radiation dose for this scan was reduced using automated exposure  control, adjustment of the mA and/or kV according to patient size, or  iterative reconstruction technique    CONTRAST: ISOVUE-370 70mL    FINDINGS:    NECK CTA:    Aortic arch: Normal configuration of the aortic arch. No high-grade  stenosis at the origins of the major branch vessels.    Left carotid artery: No high-grade stenosis or occlusion. No evidence  of dissection.    Right carotid artery: No high-grade stenosis or occlusion. Similar  mild mixed atherosclerotic plaque about the carotid bifurcation. No  evidence of dissection.    Vertebral arteries: Codominant. No high-grade stenosis or occlusion.  No evidence of dissection.    HEAD CTA:      Anterior circulation: No high-grade stenosis or occlusion. Similar  changes related to prior PATRICK stenting and A3 segment aneurysm  embolization without evidence of residual/recurrent aneurysm. Similar  3 mm superior/laterally directed outpouching arising from the  cavernous right ICA (series 7/image 380). Similar 3 mm outpouching  arising from the right MCA bifurcation, which projects  posteriorly/superiorly (series 7/image 329). No new aneurysms. No  evidence of high flow vascular malformation. Conventional [place] of  [place] anatomy.    Posterior circulation: No high-grade stenosis or occlusion. No  aneurysm. No evidence of high flow vascular  malformation.    Venous structures: Grossly patent.     Additional findings: Centrilobular pulmonary emphysema. Multilevel  cervical spondylosis, unchanged.      Impression    IMPRESSION:    HEAD CTA:  1.  No high-grade stenosis or occlusion involving the major  intracranial arteries.  2.  Changes of prior PATRICK stenting and A3 segment aneurysm  embolization. No evidence of aneurysm recurrence.  3.  Unchanged 3 mm aneurysms involving the cavernous right ICA and at  the right MCA bifurcation.    NECK CTA:  1.  No high-grade stenosis or occlusion involving the major arteries  of the neck.  2.  No evidence of dissection.    MILADY FOUNTAIN MD         SYSTEM ID:  [id number]

## 2023-11-18 NOTE — PLAN OF CARE
"Pt here w/ L facial droop and generalized weakness; Concerns of hydrocephalus, possibly related to  shunt malfunction. Arrived last night around 2200 via EMS transfer      Pt is alert and oriented to self only; forgetful, baseline weakness; L side more slightly weaker than R side, Extremities scored 2/5, limbs fall to immediately after gravity is eliminated, ataxia present in lower extremities, pt unable to follow certain commands, mild L facial droop, aphasic, difficulty communicating w/ words; hesitant, drift present in BUEs, can respond using \"yes\" or \"no\"     H/x of SAH, dysphagia, chronic hemiparesis on L side, facial droop and cognitive impairment     A2, w/ lift; Q2Ts, purewick in place     Skin WDL except for scattered bruising and pressure sore on sacrum/ coccyx; Skin barrier applied    1 PIV; infusing LR @ 75 mL/hr     NPO pending speech eval; SLP consulted, neurosurgery consulted            "

## 2023-11-18 NOTE — PROGRESS NOTES
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     Recurrent left facial droop  Hx of aneurysmal SAH with large intraparenchymal hemorrhage cingulate gyri secondary to ruptured pericallosal left PATRICK aneurysm status post A3 clipping 3/2023 and [employer] device 8/2023   Hx of obstructive hydrocephalus s/p  shunt placement 6/9/23  SDH secondary to over-draining shunt 7/2023  Chronic left hemiparesis   *Presents to with nonverbal episode, recurrent left facial droop, reported R arm weakness.   *CT head with finding concerning for developing hydrocephalus, no convincing evidence of trans ependymal edema. CTA head/neck with no high-grade stenosis or occlusion, changes of prior stenting with no evidence of aneurysm recurrence, unchanged 3 mm aneurysms involving cavernous right ICA and at right MCA bifurcation  *Transferred for NSG evaluation. Attempted transfer to [facility] where she had her prior NSG care, no bed available.   *Recently admitted at [facility] 11/7-11/13/23 for similar presentation, ultimately found to have worsening hydrocephalus with shunt reprogrammed from 1.5 to 1.0 (previously increased from 1.0 to 1.5 8/2023 due to shunt overdrainage)  *On arrival able to answer basic questions (name, \"hospital\" as location). Persistent left facial droop. Following basic commands, not complex commands. Not obtunded. Pupils normal.    - Neurosurgery consulted  - Shunt malfunction XR series " ordered - pending.  Preliminary per neurosurgery = no concers with continueiut of the tubing.,   - had Continuous  EEG earlier this month, negative for seizures. (11/8/23 to 11/10/23, with a total recording duration of 50 hours and 9 minutes).   - Continue prior to admission levetiracetam (IV while NPO)  - NPO pending speech eval, SLP consulted  - Resume prior to admission aspirin, clopidogrel when cleared for PO    - seen by Speech path:  Recommendations are :  1. IDDSI level 4 puree and thin liquids. 2. 1:1 assistance/supervision, upright, no straws, small bites/sips, and alternate liquids/solids.     LATER ENTRY:  spoke with daughter today.   Patient seems better this morning.   She is eating and able to speak simple sentences, has a sense of humor.    Neurosurgery did not see any need for surgery and will adjust the Shunt from 1.0 to 0.5.   They recommend transfer to hospital where her primary neurosurgery team can see her/follow her  Patient has outpatient f/u in a couple weeks with primary neurosurgeon (Dr Umang Caceres, [facility] neurosurgery)         If patient is transferred, [facility] would be [facility] in [place] (where [facility] Neurosurgery sees inpatients)   However, patient seems to be returning to baseline.     Will have physical therapy see her and assess her activity level/ability.   If patient seems to be at baseline and no other medical issues to address, would d/c to NH tomorrow.  Will get CC involved  If she needs to stay longer, will work on transfer tomorrow rather than discharge.       Hypertension  - Resume prior to admission lisinopril, metoprolol when cleared for PO    GERD  - Continue prior to admission PPI IV/PO    Hx of PE s/p IVC filter placement  Not on anticoagulation presently. Had temporary IVC filter placed 4/2023, subsequently removed when able to start AC, subsequently replaced again 8/2023 due to SDH above, do not see notes/procedure indicating has been removed  again.     - Enoxaparin subcutaneous ppx     Tobacco use disorder  Currently on nicotine patch PTA.  - Nicotine replacement ordered            Diet: NPO for Medical/Clinical Reasons Except for: No Exceptions    DVT Prophylaxis: Enoxaparin (Lovenox) subcutaneous   Has IVC filter.   Dominguez Catheter: Not present  Lines: None     Cardiac Monitoring: None  Code Status: Full Code      Clinically Significant Risk Factors Present on Admission                # Drug Induced Platelet Defect: home medication list includes an antiplatelet medication   # Hypertension: Noted on problem list          # Financial/Environmental Concerns:           Disposition Plan      Expected Discharge Date: 11/19/2023                    Aleksandra Saenz MD  Hospitalist Service  [facility]  Securely message with [facility] (more info)  Text page via [facility] Paging/Directory   ______________________________________________________________________    Interval History   No ON events       Physical Exam   Vital Signs: Temp: 99.3  F (37.4  C) Temp src: Oral BP: 139/78 Pulse: 77   Resp: 17 SpO2: 95 % O2 Device: None (Room air)    Weight: 0 lbs 0 oz    Constitutional: awake, cooperative, no apparent distress, and pale  HEENT:  cachectic    Edentulous  Respiratory: No increased work of breathing, good air exchange, clear to auscultation bilaterally, no crackles or wheezing  Cardiovascular: Normal apical impulse, regular rate and rhythm, normal S1 and S2, no S3 or S4, and no murmur noted  Neurologic: Awake, alert, oriented to name, and month.  Cranial nerves II-XII are grossly intact, a slight facial droop is noted only at rest.  Her speech is fluent though not complex.  Motor is 2/5 weakness in both legs (likely baseline) and both upper extremities are 4/5 (left may be slightly weaker than right) bilaterally.  Cerebellar finger to nose ntact.   .  Neuropsychiatric: General: calm and poor eye contact  Level of consciousness: alert /  normal  Affect: pleasant and flat  Orientation: needs hints and coaching, oriented to self, month .    Memory and insight: impaired    .     Medical Decision Making             Data     I have personally reviewed the following data over the past 24 hrs:    5.8  \   13.1   / 281     134 (L) 99 13.0 /  91   3.9 22 0.75 \     Trop: 12 BNP: N/A     INR:  1.12 PTT:  26   D-dimer:  N/A Fibrinogen:  N/A       Imaging results reviewed over the past 24 hrs:   Recent Results (from the past 24 hour(s))   CT Head w/o Contrast    Narrative    EXAM: CT HEAD W/O CONTRAST  11/17/2023 2:10 PM     HISTORY: Code Stroke to evaluate for potential thrombolysis and  thrombectomy. PLEASE READ IMMEDIATELY.       COMPARISON: Head CT: 11/6/2023 and 11/5/2023.    TECHNIQUE: Using multidetector thin collimation helical acquisition  technique, axial, coronal and sagittal CT images from the skull base  to the vertex were obtained without intravenous contrast.   (topogram) image(s) also obtained and reviewed.    Radiation dose for this scan was reduced using automated exposure  control, adjustment of the mA and/or kV according to patient size, or  iterative reconstruction technique.    FINDINGS:    Calvarium/skull base: No acute calvarial fracture. No destructive  osseous lesions.  Mastoids and middle ears are clear. Bifrontal jc  holes. Right frontal approach ventriculostomy catheter. Imaged  portions of the shunt catheter tubing appear intact. Similar small  piece of shunt catheter tubing along the anterior interhemispheric  fissure.    Orbits: Imaged portions are within normal limits.    Paranasal sinuses: No substantial paranasal sinus disease.    Brain: No acute intracranial hemorrhage.  No evidence of acute large  vascular territory ischemic infarct.  Similar areas of bifrontal  encephalomalacia, right greater than left. Similar positioning of  right frontal approach ventriculostomy catheter with distal tip  crossing midline and  terminating in the left frontal horn. In  comparison with studies performed earlier this month, there has been a  progressive interval increase in size of the ventricular system, most  pronounced in the frontal horns and temporal horns. The frontal horns  measure 52 mm in transverse diameter, previously 47 mm measured  retrospectively. No convincing evidence of significant transependymal  edema at this time. Similar background of mild chronic microvascular  ischemic disease. Mild generalized cerebral parenchymal volume loss.  Basal cisterns are patent.      Impression    IMPRESSION:    1.  Findings concerning for developing hydrocephalus, further  described above. No convincing evidence of transependymal edema at  this time. Similar positioning of right frontal approach  ventriculostomy catheter. Imaged portions of the shunt catheter tubing  appear intact.  2.  No acute intracranial hemorrhage or evidence of acute large  vascular territory infarct.  3.  Additional chronic changes as described above.    Findings were discussed via telephone with Dr. Kearns by myself  at approximately 2:20 PM CST on 11/17/2023.    MILADY FOUNTAIN MD         SYSTEM ID:  [id number]   CTA Head Neck with Contrast    Narrative    EXAM: CTA HEAD NECK W CONTRAST  11/17/2023 2:30 PM     HISTORY: Code Stroke to evaluate for potential thrombolysis and  thrombectomy. PLEASE READ IMMEDIATELY.       COMPARISON: Head and neck CTA: 11/5/2023.    TECHNIQUE:  HEAD and NECK CTA: During rapid bolus intravenous injection of  nonionic contrast material, axial images were obtained using thin  collimation multidetector helical technique from the base of the upper  aortic arch through the [place] of [place]. This CT angiogram data was  reconstructed at thin intervals with mild overlap. Images were sent to  the 3D workstation, and 3D reconstructions were obtained. The axial  source images, multiplanar reformations, 3D reconstructions in both  maximum  intensity projection display and volume rendered models were  reviewed, with reconstructions performed by the technologist.    Radiation dose for this scan was reduced using automated exposure  control, adjustment of the mA and/or kV according to patient size, or  iterative reconstruction technique    CONTRAST: ISOVUE-370 70mL    FINDINGS:    NECK CTA:    Aortic arch: Normal configuration of the aortic arch. No high-grade  stenosis at the origins of the major branch vessels.    Left carotid artery: No high-grade stenosis or occlusion. No evidence  of dissection.    Right carotid artery: No high-grade stenosis or occlusion. Similar  mild mixed atherosclerotic plaque about the carotid bifurcation. No  evidence of dissection.    Vertebral arteries: Codominant. No high-grade stenosis or occlusion.  No evidence of dissection.    HEAD CTA:      Anterior circulation: No high-grade stenosis or occlusion. Similar  changes related to prior PATRICK stenting and A3 segment aneurysm  embolization without evidence of residual/recurrent aneurysm. Similar  3 mm superior/laterally directed outpouching arising from the  cavernous right ICA (series 7/image 380). Similar 3 mm outpouching  arising from the right MCA bifurcation, which projects  posteriorly/superiorly (series 7/image 329). No new aneurysms. No  evidence of high flow vascular malformation. Conventional [place] of  [place] anatomy.    Posterior circulation: No high-grade stenosis or occlusion. No  aneurysm. No evidence of high flow vascular malformation.    Venous structures: Grossly patent.     Additional findings: Centrilobular pulmonary emphysema. Multilevel  cervical spondylosis, unchanged.      Impression    IMPRESSION:    HEAD CTA:  1.  No high-grade stenosis or occlusion involving the major  intracranial arteries.  2.  Changes of prior PATRICK stenting and A3 segment aneurysm  embolization. No evidence of aneurysm recurrence.  3.  Unchanged 3 mm aneurysms involving the  cavernous right ICA and at  the right MCA bifurcation.    NECK CTA:  1.  No high-grade stenosis or occlusion involving the major arteries  of the neck.  2.  No evidence of dissection.    MILADY FOUNTAIN MD         SYSTEM ID:  [id number]   XR Shunt Malfunction Surgery Survey    Narrative    EXAM: XR SHUNT MALFUNCTION SURVEY  LOCATION: [facility]  DATE: 11/18/2023    INDICATION: Hydrocephalus, eval shunt  COMPARISON: None.      Impression    IMPRESSION: Shunt tube is intact over the calvarium, neck, chest, and abdomen. No kinks or breaks. No significant incidental findings.

## 2023-11-18 NOTE — PROGRESS NOTES
11/18/23 1600   Appointment Info   Signing Clinician's Name / Credentials (PT) Yenifer Sealsana maría, PT, DPT   Living Environment   Living Environment Comments Pt lives in [place], where she had been receiving AX1 until few weeks ago, where dtr reports pt has been receiving Ax2 with power stand.   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 Pt nods yes/no, at times incorrectly. Does not speak.   Pain Assessment   Patient Currently in Pain   (Did not appear to be in any distress.)   Range of Motion (ROM)   ROM Comment Limited 2/2 weakness, incr tone.   Strength (Manual Muscle Testing)   Strength (Manual Muscle Testing) Deficits observed during functional mobility   Strength Comments Unable to formally assess, able to lift extremities anti-gravity, inconsistentlty. To writer appared L more able than R, but with RN both UEs/LEs generally weak but anti-gravity.   Bed Mobility   Comment, (Bed Mobility) modA x 2   Transfers   Comment, (Transfers) SST w/SS and mod A x 2   Gait/Stairs (Locomotion)   Comment, (Gait/Stairs) Unable, unclear if walks at baseline; per chart mention of wheelchair; dtr busy talking to provider during session   Balance   Balance Comments Impaired static and dynamic sitting, standing; Needs B UE support with standing   Muscle Tone    Muscle Tone Comments Globally icnr tone B UEs/LEs, moderately severe throughout range with flexion/extension at elbows/knees; no cogwheel rigidity   Clinical Impression   Criteria for Skilled Therapeutic Intervention Yes, treatment indicated   PT Diagnosis (PT) Impaired fn mobility   Influenced by the following impairments Power generation, sustaining effort, nm control (tone, initiation of mvmts), affect/cognition   Functional limitations due to impairments Impaired bed mob, transfers   Clinical Presentation (PT Evaluation Complexity) evolving   Clinical Presentation Rationale Multiple affecting comorbidities, assessment icnl tone, ROM, strength, balance, fn mob.   Clinical Decision Making (Complexity) moderate complexity   Planned Therapy Interventions (PT) balance training;bed mobility training;gait training;patient/family education;postural re-education;ROM (range of motion);strengthening;stretching   Risk & Benefits of therapy have been explained evaluation/treatment results reviewed;care plan/treatment goals reviewed;participants voiced agreement with care plan;participants included;patient;daughter;risks/benefits reviewed;current/potential barriers reviewed   PT Total Evaluation Time   PT Eval, Moderate Complexity Minutes (39290) 5   Physical Therapy Goals   PT Frequency 5x/week   PT Predicted Duration/Target Date for Goal Attainment 11/23/23   PT Goals Bed Mobility;Transfers;Aerobic Activity   PT: Bed Mobility Minimal assist   PT: Transfers Moderate assist;Bed to/from chair   PT: Perform aerobic activity with stable cardiovascular response 5 minutes;intermittent activity;continuous activity   Interventions   Interventions Quick Adds Therapeutic Activity   Therapeutic Activity   Therapeutic Activities: dynamic activities to improve functional performance Minutes (35755) 27   Symptoms Noted During/After Treatment Fatigue   Treatment Detail/Skilled Intervention VSS at start/end; dtr came in partway, speaking  to provider. Unclear baseline, level of assist. Was mention of Ax1 at long-term then transitioning to Ax2 in last several weeks; Also was mention of comfort cares, early conversation. Per current orders and dtr wishes, initiate fn mobility. Pt completed bed mob to L side of bed with modAx2. Sitting EOB pt pushing backwards intermittently, needing CG-modA. Able to scoot to EOB w/Ta. Repetition of cues required, pt stares blankly into space, takes longer to initiate movements. Completed sit<>stand at SS with two trials to get to upright standing, max A x 2 initially, progressed to modA x 2. Pt did not achieve full upright standing, incr tone throughout. Completed stand>sit at SS w/Ta, heavy tactile cues. Transferred to recliner with Ta x2, maxA x 1 for sit>stand and lowered to recliner with max A x 1. Pt completing LAQs herself at end of session, sorting through papers on her desk. Left with RN, NA and chair alarm on. Call light in reach.   PT Discharge Planning   PT Plan Indep w/bed mob, transfers   PT Discharge Recommendation (DC Rec) Collaborated with PT for updated discharge location;Transitional Care Facility;home with assist;home with home care physical therapy;Long term care facility   PT Rationale for DC Rec Unclear level of assist at baseline, Ax1-2. Per dtr, pt is below her baseline over last 2-3 weeks needing Ax2 w.overhead valente. Prior to that, she had been using Ax2 for stand pivot transfers to wheelchair. Pt currently requiring mod-max A x2 for bed mob and transfers using SaraSteady device. Pt exhibits incr tone, decr initiation of movement, she pushes backwards in sitting and standing and does not engage verbally during session. WIth verbal cues does appear at times able to initiate some movemetns; bring L UE on top of her head, able to kick B LEs while seated, able to sit briefly edge of bed unsupported. If d/c back to a facility, she requires Ax2 and overhead valente for safety and would benefit from  HHPT to reduce falsl risk and maximize fn outcomes. Pending pt and family wishes, might benefit from HHPT to progress indep, maximize quality of life. Might benefit from TCUpending medical plan, pt and family wishes.   PT Brief overview of current status Ax2 w/Asha Steady   PT Equipment Needed at Discharge lift device   Total Session Time   Timed Code Treatment Minutes 27   Total Session Time (sum of timed and untimed services) 32

## 2023-11-18 NOTE — ED NOTES
Pt alert at transfer. Pt vss 's . Pt in NSR. Pt knows name and follows commands. Appears more alert. Moving arms. Left still weak as baseline.

## 2023-11-18 NOTE — PROGRESS NOTES
11/18/23 1234   Appointment Info   Signing Clinician's Name / Credentials (SLP) Violet Vaz MS CCC SLP   General Information   Onset of Illness/Injury or Date of Surgery 11/17/23   Referring Physician Dr. 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  Transferred to [facility] on 11/17/2023.   Type of Evaluation   Type of Evaluation Swallow Evaluation   Oral Motor   Oral Musculature unable to assess due to poor participation/comprehension   Mucosal Quality dry   Dentition (Oral Motor)   Dentition (Oral Motor) edentulous   Vocal Quality/Secretion Management (Oral Motor)   Vocal Quality (Oral Motor) breathy   Secretion Management (Oral Motor) WNL   General Swallowing Observations   Current Diet/Method of Nutritional Intake (General Swallowing Observations, NIS) NPO   Respiratory Support room air   Past History of Dysphagia Patient recently seen at [facility] on 11/7/23 and a puree diet and thin liquids was recommended. Baseline diet is mechanical soft and thin.   Swallowing Evaluation Clinical swallow evaluation   Clinical Swallow Evaluation   Feeding Assistance dependent   Clinical Swallow Evaluation Textures Trialed thin liquids;pureed   Clinical Swallow Eval: Thin Liquid Texture Trial   Mode of Presentation, Thin Liquids cup;self-fed   Volume of Liquid or Food Presented 4 oz of water   Oral Phase of Swallow premature pharyngeal entry   Pharyngeal Phase of Swallow impaired;coughing/choking   Diagnostic Statement Premature entry with consecutive swallow and  a cough. No difficulty with single swallows.   Clinical Swallow Evaluation: Puree Solid Texture Trial   Mode of Presentation, Puree spoon;fed by clinician   Volume of Puree Presented 8 teaspoons of apple sauce   Oral Phase, Puree delayed AP movement;residue in oral cavity;premature pharyngeal entry   Oral Residue, Puree mid posterior tongue   Pharyngeal Phase, Puree intact   Diagnostic Statement Masticated the apple sauce with milld delay in AP movement. No sx of aspiration.   Swallowing Recommendations   Diet Consistency Recommendations pureed (level 4);thin liquids (level 0)   Supervision Level for Intake 1:1 supervision needed   Mode of Delivery Recommendations bolus size, small;no straws;slow rate of intake   Swallowing Maneuver Recommendations alternate food and liquid intake   Monitoring/Assistance Required (Eating/Swallowing) check mouth frequently for oral residue/pocketing;stop eating activities when fatigue is present;monitor for cough or change in vocal quality with intake   Recommended Feeding/Eating Techniques (Swallow Eval) maintain upright sitting position for eating;maintain upright posture during/after eating for 30 minutes;minimize distractions during oral intake;provide assist with feeding;set-up and prepare tray   Medication Administration Recommendations, Swallowing (SLP) Crush pills if able or whole as tolerated.   General Therapy Interventions   Planned Therapy Interventions Dysphagia Treatment   Dysphagia treatment Modified diet education;Instruction of safe swallow strategies   Clinical Impression   Criteria for Skilled Therapeutic Interventions Met (SLP Eval) Yes, treatment indicated   SLP Diagnosis Mild to moderate oral and pharyngeal dysphagia   Risks & Benefits of therapy have been explained evaluation/treatment results reviewed;care plan/treatment goals reviewed;risks/benefits reviewed;current/potential barriers reviewed;participants voiced agreement with care plan;participants  included;patient   Clinical Impression Comments Patient presents with mild to moderate oral and pharyngeal dysphagia at bedside. Patient recently seen at [facility] on 11/7/23, and due to pocketing was downgraded to puree and thin liquids. She was upright and given trials of thin and puree due to edentulous. There was premature entry suspected with thin liquids and a cough x1 on consecutive swallows. Tolerated single swallows with verbal cues. She masticated the puree texture with mildly delay in AP movement with minimal oral residue after the swallow. No sx of aspiration and cleared with a liquid rinse. Recommend: 1. IDDSI level 4 puree and thin liquids. 2. 1:1 assistance/supervision, upright, no straws, small bites/sips, and alternate liquids/solids.   SLP Total Evaluation Time   Eval: oral/pharyngeal swallow function, clinical swallow Minutes ([id number]) 18   SLP Goals   Therapy Frequency (SLP Eval) 3 times/week   SLP Predicted Duration/Target Date for Goal Attainment 11/25/23   SLP Goals Swallow   SLP: Safely tolerate diet without signs/symptoms of aspiration Minced & moist diet;Thin liquids   SLP Discharge Planning   SLP Plan Meal f/u puree and thins   SLP Discharge Recommendation Transitional Care Facility   SLP Rationale for DC Rec Swallow function is below her baseline.   SLP Brief overview of current status  Mild to moderate oral and phayrngeal dysphagia rec puree and thin liquids no straws.   Total Session Time   Total Session Time (sum of timed and untimed services) 18

## 2023-11-18 NOTE — CONSULTS
Neurosurgery Consult    HPI    From neurosurgery consultation 11/10/2023, Carmella Beavers NP  Ms. Meade is a 68 y.o. female with history of post hemorrhagic hydrocephalus with vps placement by Dr Caceres on 6/9/23.     Ms. Cm presented to [facility] on 11/7/2023 for left sided facial droop. Pertinent medical history:     March 2023: Aneurysmal subarachnoid hemorrhage (left A3 segment) complicated by obstructive hydrocephalus requiring  evd placement  June 2023: Admitted for confusion and found to have expressive aphasia in the setting of communicating hydrocephalus. Left sided hemiplegia including facial weakness noted at this time per Monica Keita NP. Neurosurgery consulted and patient underwent  shunt on 6/09/2023. Left facial droop noted by neurosurgery upon discharge  August 2023:  Evaluated by Dr. Patterson. Muteness along with right sided deficit noted at this time. MRI brain without acute ischemic changes. Fluctuating exam due to suspected subclinical seizure activity. Imaging showed sdh felt to be related to shunt overdrainage. Shunt adjusted from 1.0 to 1.5.     During that admission her shunt was returned to the 1.0 station on 11/10/2023..  She was in traction to follow-up in 1 month with [facility] neurosurgery.    Patient was admitted with generalized weakness and left facial droop concerns for hydrocephalus, on 11/17/2023.    Head CT scan with findings concerning for hydrocephalus.  Stable position of right frontal approach  shunt.    Shunt tubing appears intact.    Patient is not answering questions during my exam today.  RN states she was more verbal earlier today.    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  Shunt was interrogated with the [employer] strata device, and found to be at 1.0 station, consistent with prior outside notes.    Imaging    Head CT demonstrated    IMPRESSION:     1.  Findings concerning for developing hydrocephalus, further  described above. No convincing evidence of transependymal edema at  this time. Similar positioning of right frontal approach  ventriculostomy catheter. Imaged portions of the shunt catheter tubing  appear intact.  2.  No acute intracranial hemorrhage or evidence of acute large  vascular territory infarct.  3.  Additional chronic changes as described above.    Shunt tubing appears intact, awaiting final radiology read on shunt continuity x-ray.    Assessment    Status post  shunt placement in June due to hydrocephalus following aneurysmal clipping, performed by [facility] neurosurgery with Dr. Blanco    Plan:      We will adjust the shunt down to 0.5 station, and recommend the patient be transferred to a hospital where she can be seen by her primary neurosurgical team for further management and follow-up, or if she is able to discharge she can follow-up with them as an outpatient.  I spoke with her primary neurosurgery team, they recommend follow-up in 1 week with their clinic if she discharges home from the hospital today, they will call the patient to arrange this.     No surgical intervention is recommended.    Discussed with Dr. Umana.

## 2023-11-18 NOTE — CARE PLAN
Initial ACP charge (used for initial 16-45 min of ACP discussion)    I spent 16 minutes of advance care planning time discussing Sandro health conditions with Nakita VIDALES (daughter/caregiver) after extensively reviewing Soila's chart  Eight months ago, she had a catastrophic health event (SAH due to aneurysmal bleed).  Has not regained full function nor good quality of life due to permanent brain damage.   She had had impaired mobility, impaired brain function, and profound weight loss.   It is reasonable to expect that Soila could not recover from any further health events with improved outcome.  In fact, it is reasonable to expect ongoing decline.   Aggressive measures of any kind would not improve her outcome.   In my opinion, Sandro prognosis is less than 6 months.       Soila is not competent to make decisions due to poor insight and and poor memory since she had the SAH.      We discussed the following;  Goals of care, expanding on advance directives, determining surrogate decision makers with Samara (daughter) regarding the care of Soila Meade.      Samara is agreeable to discuss further with CC (hospice consult) and she will be discussing with her siblings and uncles (Soila's brothers)

## 2023-11-18 NOTE — PHARMACY-ADMISSION MEDICATION HISTORY
Admission medication history completed at [facility]. Please see Medication Scribe Admission Medication History note from 11/17/2023.    Rafita Crawley [facility]      Medication Scribe Admission Medication History     Admission medication history is complete. The information provided in this note is only as accurate as the sources available at the time of the update.     Information Source(s): Facility ([facility]/) medication list/MAR via N/A     Pertinent Information: MAR from [facility]     Changes made to PTA medication list:  Added: miralax  Deleted: rocephin IM  Changed: metoprolol dose, nicoderm strength     Medication Affordability:  Not including over the counter (OTC) medications, was there a time in the past 3 months when you did not take your medications as prescribed because of cost?: Unable to Assess     Allergies reviewed with patient and updates made in EHR: unable to assess     Medication History Completed By: HERMILO JAMES 11/17/2023 2:51 PM           PTA Med List   Medication Sig Last Dose    acetaminophen (TYLENOL) 325 MG tablet Take 650 mg by mouth every 8 hours as needed for pain 11/17/2023 at 1222    aspirin 81 MG EC tablet Take 81 mg by mouth daily 11/17/2023 at 0921    clopidogrel (PLAVIX) 75 MG tablet Take 75 mg by mouth daily Cerebral aneurysm, nonruptured 11/17/2023 at 0921    ferrous sulfate (FE TABS) 325 (65 Fe) MG EC tablet Take 325 mg by mouth daily 11/17/2023 at 0921    levETIRAcetam (KEPPRA) 100 MG/ML oral solution Take 10 mLs by mouth 2 times daily Cerebral aneurysm, nonruptured 11/17/2023 at 0921    lisinopril (ZESTRIL) 5 MG tablet Take 5 mg by mouth daily HOLD for systolic BP <110 11/17/2023 at 0921    melatonin 5 MG tablet Take 5 mg by mouth at bedtime 11/16/2023 at 2305    metoprolol tartrate (LOPRESSOR) 25 MG tablet Take 75 mg by mouth 2 times daily With food 11/17/2023 at 0921    nicotine (NICODERM CQ) 21 MG/24HR 24 hr patch Place 1 patch onto the skin  every 24 hours 11/17/2023 at 0921    pantoprazole (PROTONIX) 40 MG EC tablet Take 40 mg by mouth daily 11/17/2023 at 0921    polyethylene glycol (MIRALAX) 17 g packet Take 17 g by mouth daily as needed for constipation  at not started    vitamin C (ASCORBIC ACID) 500 MG tablet Take 500 mg by mouth daily 11/17/2023 at 0921

## 2023-11-19 NOTE — PLAN OF CARE
Goal Outcome Evaluation:  Alert. Oriented to self and aware that she is in a hospital, sometimes knows the month or year. Intermittently aphasic, moderate to severe; will answer one questions with a short answer but then be unable to verbalize. Is able to consistently repeat some words, correctly count fingers, and state her name. Follows most commands. Generalized weakness, LUE weaker than RUE but no difference noted in BLE. Responds to touch all 4 extremities. Eating 50% of meals, on pureed diet with thin liquids. Up to chair with PT using karen steady and strong assist of 2 with belt, back to bed with lift. Daughter at bedside and considering hospice route given pt's recent decline. WOC RN consult requested for coccyx wound, mepilex in place. Scoring green on aggression screening tool.

## 2023-11-19 NOTE — PLAN OF CARE
"     Pt here w/ L facial droop and generalized weakness; Concerns of hydrocephalus, possibly related to  shunt malfunction.      Pt is alert and oriented x 3, disoriented to time; forgetful, baseline weakness; able to speak but hesitant,  pt unable to follow certain commands, slight L facial droop, aphasic, difficulty communicating w/ words; hesitant, drift present in BUEs, can respond using \"yes\" or \"no\"      H/x of SAH, dysphagia, chronic hemiparesis on L side     A2, w/ lift; Q2Ts, purewick in place      VSS on RA     INC/ INC    Pureed diet, thin liquids, purewick in place     Skin WDL except for scattered bruising and pressure sore on sacrum/ coccyx; Skin barrier applied    Plan for WOC RN consult for wound care, family discussing hospice care, discharge pending                   "

## 2023-11-19 NOTE — PROGRESS NOTES
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 on 11/17/2023.         PRINCIPAL PROBLEM:      Recurrent left facial droop  Hx of aneurysmal SAH with large intraparenchymal hemorrhage cingulate gyri secondary to ruptured pericallosal left PATRICK aneurysm status post A3 clipping 3/2023 and [employer] device 8/2023   Hx of obstructive hydrocephalus s/p  shunt placement 6/9/23  SDH secondary to over-draining shunt 7/2023  Chronic left hemiparesis       IINTERVENTIONS THIS ADMISSION:  Neurosurgery adjusted shunt from 1.0 to 0.5    DECISIONS THIS ADMISSION:  No reversible new medical conditions/infections found  Patient is likely declining physically in a stuttering course due to events  Will plan to d/c in morning back to 24 hour care [facility].  Meds Reconciled.     Today, 11/19/23 Family discussion about goals of care:  Patient also present  Will likely move toward a comfort focus.   Did not change orders, as this will take place at facility.       Family will want to have a PLAN with Neurosurgery team at [facility] (Dr Caceres with [facility] Neurosurgery).     Family has a good relationship with this team, and will be contacting them tomorrow morning (11/20/23)  to see about a plan.... THe goal is to streamline neurosurgery evaluations:   when there IS concern about shunt function, would like to streamline this so there is not hospitalization.       As of now, code status not changed, but a lot of focus on goals of care during  Pt called to inquire what compression strength of compression stocking does Dr. Armstrong want her to obtain.  Pls call, msg can be left   "this stay.        CC discussed the process of pursuing hospice if it seems appropriate in near future.         BACKGROUND THIS ADMISSION  *Presents to with nonverbal episode, recurrent left facial droop, reported R arm weakness.   *CT head with finding concerning for developing hydrocephalus, no convincing evidence of trans ependymal edema. CTA head/neck with no high-grade stenosis or occlusion, changes of prior stenting with no evidence of aneurysm recurrence, unchanged 3 mm aneurysms involving cavernous right ICA and at right MCA bifurcation  *Transferred for NSG evaluation. Attempted transfer to [facility] where she had her prior NSG care, no bed available.   *Recently admitted at [facility] 11/7-11/13/23 for similar presentation, ultimately found to have worsening hydrocephalus with shunt reprogrammed from 1.5 to 1.0 (previously increased from 1.0 to 1.5 8/2023 due to shunt overdrainage)  *On arrival able to answer basic questions (name, \"hospital\" as location). Persistent left facial droop. Following basic commands, not complex commands. Not obtunded. Pupils normal.    - Neurosurgery consulted  - Shunt malfunction XR series ordered -   = no concers with continuity of the tubing.,   - had Continuous  EEG earlier this month, negative for seizures. (11/8/23 to 11/10/23, with a total recording duration of 50 hours and 9 minutes).   - Continue prior to admission levetiracetam (IV while NPO)     - Resume prior to admission aspirin, clopidogrel when cleared for PO    - seen by Speech path:  Recommendations are :  1. IDDSI level 4 puree and thin liquids. 2. 1:1 assistance/supervision, upright, no straws, small bites/sips, and alternate liquids/solids.          Hypertension  - Resume prior to admission lisinopril, metoprolol when cleared for PO    GERD  - Continue prior to admission PPI IV/PO    Hx of PE s/p IVC filter placement  Not on anticoagulation presently. Had temporary IVC filter placed 4/2023, " "subsequently removed when able to start AC, subsequently replaced again 8/2023 due to SDH above, do not see notes/procedure indicating has been removed again.           Tobacco use disorder  Currently on nicotine patch PTA.  - Nicotine replacement ordered            Diet: Combination Diet Pureed Diet (level 4); Thin Liquids (level 0) (No straws)    DVT Prophylaxis: Enoxaparin (Lovenox) SQ  Dominguez Catheter: Not present  Lines: None     Cardiac Monitoring: None  Code Status: Full Code      Clinically Significant Risk Factors        # Hypokalemia: Lowest K = 3.3 mmol/L in last 2 days, will replace as needed           # Hypertension: Noted on problem list            # Financial/Environmental Concerns:           Disposition Plan      Expected Discharge Date: 11/20/2023                    Aleksandra Saenz MD  Hospitalist Service  [facility]  Securely message with [employer] (more info)  Text page via [employer] Paging/Directory   ______________________________________________________________________    Interval History   Speech/spontaneity continues to wax and wane.        Physical Exam   Vital Signs: Temp: 98  F (36.7  C) Temp src: Oral BP: 127/75 Pulse: 73   Resp: 16 SpO2: 96 % O2 Device: None (Room air)    Weight: 141 lbs 1.51 oz    Constitutional: awake, alert, cooperative, no apparent distress, and appears stated age  Teeth:  Edentulous.  (Dentures no longer fitting due to weight loss)   Respiratory: No increased work of breathing, good air exchange, clear to auscultation bilaterally, no crackles or wheezing  Cardiovascular: Normal apical impulse, regular rate and rhythm, normal S1 and S2, no S3 or S4, and no murmur noted  Neurologic: awake alert.  Apraxic.   Her speech ability waxes and wants (She gets \"Stuck\"), but when she is speaking, she will speak 5 word sentences with fluency, and appropriate.       Medical Decision Making       75 MINUTES SPENT BY ME on the date of service doing chart " review, history, exam, documentation & further activities per the note.      Data   Past 24 hours:  UCx negative   CXR normal   Recent Labs   Lab 11/19/23  0815 11/19/23  0046 11/18/23  1814 11/18/23  0735 11/17/23  1431 11/17/23  1431 11/17/23  1402   WBC  --   --   --   --   --  5.8  --    HGB  --   --   --   --   --  13.1  --    MCV  --   --   --   --   --  89  --    PLT  --   --   --   --   --  281  --    INR  --   --   --   --   --  1.12  --    NA  --   --   --   --   --  134*  --    POTASSIUM 3.8 3.7 3.3*  --    < > 3.9  --    CHLORIDE  --   --   --   --   --  99  --    CO2  --   --   --   --   --  22  --    BUN  --   --   --   --   --  13.0  --    CR  --   --   --   --   --  0.75  --    ANIONGAP  --   --   --   --   --  13  --    LUIS MANUEL  --   --   --   --   --  9.1  --    GLC  --   --   --  91  --  92 94    < > = values in this interval not displayed.

## 2023-11-20 NOTE — CONSULTS
"[facility]  WO Nurse Inpatient Assessment     Consulted for: Coccyx POA    Summary: MASD and friction skin injury to coccyx. Two tiny areas of non-blanchable redness to left coccyx. Patient discharging with hospice care.    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 with left hemiplegia, subsequent obstructive hydrocephalus s/p  shunt placement, hx of PE s/p IVC filter placement, recent admission for left facial droop attributed to hydrocephalus who presents to [facility] with left facial droop, nonverbal episode on day of admission:   Transferred to [facility] on 11/17/2023.\"    Assessment:      Areas visualized during today's visit: Sacrum/coccyx    Wound location: Coccyx        Last photo: 11/20  Wound due to: Friction and Moisture Associated Skin Damage (MASD), pressure component  Wound history/plan of care: Patient has protuberant coccyx. Wearing a brief in bed   Wound base:   Superficial erosion of epidermis to right coccyx, blanchable red/purple intact epidermis, and two areas of non-blanchable erythema to left coccyx with intact epidermis ,      Palpation of the wound bed: normal      Drainage: none     Description of drainage: none     Measurements (length x width x depth, in cm): ~ 1  x 0.3  x  0 cm      Tunneling: N/A     Undermining: N/A  Periwound skin: Erythema- blanchable and Macerated      Color: dusky      Temperature: normal   Odor: none  Pain:  Pt nodded when asked if she had pain in her bottom ,  MCKAY  Pain interventions prior to dressing change: slow and gentle cares   Treatment goal: Decrease moisture and Protection  STATUS: initial assessment  Supplies ordered: supplies stored on unit and discussed with patient       Treatment Plan:     Coccyx wound(s): BID and PRN with incontinence care and hygiene  Peel back dressing to assess coccyx every shift " "and document in flowsheet.  Keep sacral Mepilex in place. Position like an upside-down heart for best coverage.  Use Parmjit spray and white cloths for perineal and incontinence care. Avoid blue Bath wipes.  No brief in bed.  Use a single continence pad under patient. Can use as a draw sheet.     Pressure Injury Prevention (PIP) Plan:  -If patient is declining pressure injury prevention interventions: Explore reason why and address patient's concerns, Educate on pressure injury risk and prevention intervention(s), If patient is still declining, document \"informed refusal\" , and Ensure Care team is aware ( provider, charge nurse, etc)  -Mattress: Follow bed algorithm, reassess daily and order specialty mattress, if indicated.  -HOB: Maintain at or below 30 degrees, unless contraindicated  -Repositioning in bed: Every 1-2 hours , Left/right positioning; avoid supine, and Raise foot of bed prior to raising head of bed, to reduce patient sliding down (shear)  -Heels: Keep elevated off mattress  -Protective Dressing: None  -Positioning Equipment:  Pillows  -Chair positioning: Assist patient to reposition hourly and Do NOT use a donut for sitting (this increases pressure to smaller area and creates a higher potential for injury)    -Moisture Management: Perineal cleansing /protection: Follow Incontinence Protocol, Avoid brief in bed, Clean and dry skin folds with bathing , and Moisturize dry skin  -Under Devices: Inspect skin under all medical devices during skin inspection , Ensure tubes are stabilized without tension, and Ensure patient is not lying on medical devices or equipment when repositioned     Orders: Written    RECOMMEND PRIMARY TEAM ORDER: None, at this time  Education provided: plan of care, Moisture management, and Off-loading pressure  Discussed plan of care with: Patient  WOC nurse follow-up plan: weekly  Notify WO if wound(s) deteriorate.  Nursing to notify the Provider(s) and re-consult the WOC Nurse if " "new skin concern.    DATA:     Current support surface: Standard  Standard gel/foam mattress (IsoFlex, Atmos air, etc)  Containment of urine/stool: Incontinence Protocol, Incontinent pad in bed, and Purewick external catheter   BMI: Body mass index is 22.77 kg/m .   Active diet order: Orders Placed This Encounter      Combination Diet Pureed Diet (level 4); Thin Liquids (level 0) (No straws)     Output: I/O last 3 completed shifts:  In: 560 [P.O.:560]  Out: 240 [Urine:240]     Labs:   Recent Labs   Lab 11/17/23  1431   HGB 13.1   INR 1.12   WBC 5.8     Pressure injury risk assessment:   Sensory Perception: 4-->no impairment  Moisture: 4-->rarely moist  Activity: 1-->bedfast  Mobility: 2-->very limited  Nutrition: 3-->adequate  Friction and Shear: 2-->potential problem  Blake Score: 16    Dominga Monae RN, CWON  Please contact via MediaSite at name or group \"[facility] nurse\"- M-F [phone]-[phone]  Leave  @ *[phone] for non-urgent needs. Checked occasionally M-F.   "

## 2023-11-20 NOTE — PLAN OF CARE
Goal Outcome Evaluation:  Alert, oriented x 2-3. Forgetful. Varying aphasia, moderate to severe. Following some commands, generalized weakness. Difficulty initiating swallow this morning and again at bedtime, poor oral intake on pureed diet with thin liquids. Able to take small pills whole with pudding or juice. VSS. Up with lift and assist of 2. Turned in bed q 2 hours. Scoring green on aggression screening tool. Pt may return back to [facility] tomorrow, discussed goals of care today with physician and SW.

## 2023-11-20 NOTE — DISCHARGE SUMMARY
[facility]  Hospitalist Discharge Summary      Date of Admission:  11/17/2023  Date of Discharge:  11/20/2023  Discharging Provider: Sanjiv Watkins MD  Discharge Service: Hospitalist Service    Discharge Diagnoses   Recurrent left facial droop, right upper extremity weakness  Hx of aneurysmal SAH with large intraparenchymal hemorrhage cingulate gyri secondary to ruptured pericallosal left PATRICK aneurysm status post A3 clipping 3/2023 and [employer] device 8/2023   Hx of obstructive hydrocephalus s/p  shunt placement 6/9/23  SDH secondary to over-draining shunt 7/2023  Chronic left hemiparesis   Hypertension  Gastroesophageal reflux disease  Hx of PE s/p IVC filter placement  History of tobacco use disorder    Clinically Significant Risk Factors          Follow-ups Needed After Discharge   Follow-up Appointments     Follow Up and recommended labs and tests      Follow up with Nursing home physician this week for hospital follow-up of   shunt, recent mental status changes, review of medications, labs.    Follow-up with primary Neurosurgery team at [facility] (Dr Caceres with [facility]) on discharge with patient's   daughter to call and schedule appointment within 1 to 2 weeks.            Unresulted Labs Ordered in the Past 30 Days of this Admission       No orders found from 10/18/2023 to 11/18/2023.        These results will be followed up by primary clinic provider and primary neurosurgery team.    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  For details, see admission note.     Neurosurgery and stroke neurology consulted on admission.  CT head on admission showing findings concerning for hydrocephalus, see report.  No acute hemorrhage or infarct identified.  CTA head-neck performed, see report, no high-grade stenosis or occlusion involving the major intracranial arteries.  Unchanged 3 mm aneurysms involving the cavernous right ICA and at the right MCA bifurcation.  Neurosurgery consulted.  X-ray for shunt malfunction survey performed.  Shunt tube intact over calvarium, neck, chest, and abdomen without kinks or breaks.  No significant incidental findings, see report.  No acute intervention recommended by neuro surgery.  Shunt adjusted from 1.0-0.5.  Neurosurgery recommendations for follow-up with primary neurosurgical team at [facility] (Dr. Caceres with [facility]).  Speech therapy consulted then diet advanced.  Goals of care discussion by previous provider with patient and family with social work consulted.  Future consideration of comfort care focus.  This will be reassessed at long-term care facility following discharge with primary provider and family.  History hypertension with lisinopril and metoprolol restarted.  History of pulmonary embolism and previous IVC filter placement, not presently on anticoagulation.  Follow-up with primary clinic provider and neurosurgery.  History of nicotine use disorder, on nicotine replacement.  Future cessation is recommended, follow-up with primary clinic provider.   Patient's daughter called on day of discharge with questions answered and concerns addressed.  Outpatient follow-up with neurosurgery discussed and recommended.  Follow-up with outpatient primary provider within 1 week of discharge as well.    Consultations This Hospital Stay   SPEECH LANGUAGE PATH ADULT IP  CONSULT  NEUROSURGERY IP CONSULT  PHYSICAL THERAPY ADULT IP CONSULT  CARE MANAGEMENT / SOCIAL WORK IP CONSULT  WOUND OSTOMY CONTINENCE NURSE  IP CONSULT    Code Status   Full Code    Time Spent on this Encounter   I, Sanjiv Watkins MD, personally saw the patient today and spent greater than 30 minutes discharging this patient.       Sanjiv Watkins MD  [facility] NEUROSCIENCE UNIT  [address] [place] [address]  Phone: [phone]  ______________________________________________________________________    Physical Exam   Vital Signs: Temp: 98.2  F (36.8  C) Temp src: Oral BP: 124/64 Pulse: 68   Resp: 16 SpO2: 97 % O2 Device: None (Room air)    Weight: 141 lbs 1.51 oz    GENERAL awake and alert, lying in bed, pleasant and interactive, watching television, comfortable without new complaints  LUNGS no wheezes or crackles  HEART S1, S2 with RRR  ABDOMEN soft, nontender  MUSCULOSKELETAL extremities without edema  SKIN warm and dry  NEURO moves upper and lower extremities spontaneously and to command, squeezes hands and moves feet  MENTAL STATUS answering questions and following simple commands       Primary Care Physician   Physician No Ref-Primary    Discharge Orders      General info for SNF    Length of Stay Estimate: Long Term Care  Condition at Discharge: Stable  Level of care:skilled   Rehabilitation Potential: Fair  Admission H&P remains valid and up-to-date: Yes  Recent Chemotherapy: N/A  Use Nursing Home Standing Orders: Yes     Reason for your hospital stay    Mental status changes, shunt evaluation, neurosurgery consultation.     Activity - Up with nursing assistance     Follow Up and recommended labs and tests    Follow up with Nursing home physician this week for hospital follow-up of shunt, recent mental status changes, review of medications, labs.    Follow-up with primary Neurosurgery team at [facility] (Dr Caceres with [facility]) on discharge with  patient's daughter to call and schedule appointment within 1 to 2 weeks.     Fall precautions     Diet    Follow this diet upon discharge: Orders Placed This Encounter      Combination Diet Pureed Diet (level 4); Thin Liquids (level 0) (No straws)       Significant Results and Procedures   Most Recent 3 CBC's:  Recent Labs   Lab Test 11/17/23  1431 11/06/23  0609 11/05/23  1542   WBC 5.8 8.7 11.3*   HGB 13.1 12.3 12.2   MCV 89 88 89    413 426     Most Recent 3 BMP's:  Recent Labs   Lab Test 11/19/23  0815 11/19/23  0046 11/18/23  1814 11/18/23  0735 11/17/23  1431 11/17/23  1402 11/06/23  0609 11/05/23  1542   NA  --   --   --   --  134*  --  137 136   POTASSIUM 3.8 3.7 3.3*  --  3.9  --  3.8 4.1   CHLORIDE  --   --   --   --  99  --  100 99   CO2  --   --   --   --  22  --  23 26   BUN  --   --   --   --  13.0  --  16.2 18.3   CR  --   --   --   --  0.75  --  0.70 0.79   ANIONGAP  --   --   --   --  13  --  14 11   LUIS MANUEL  --   --   --   --  9.1  --  9.6 9.2   GLC  --   --   --  91 92 94 94 110*   ,   Results for orders placed or performed during the hospital encounter of 11/17/23   XR Shunt Malfunction Surgery Survey    Narrative    EXAM: XR SHUNT MALFUNCTION SURVEY  LOCATION: [facility]  DATE: 11/18/2023    INDICATION: Hydrocephalus, eval shunt  COMPARISON: None.      Impression    IMPRESSION: Shunt tube is intact over the calvarium, neck, chest, and abdomen. No kinks or breaks. No significant incidental findings.   XR Chest Port 1 View    Narrative    EXAM: XR CHEST PORT 1 VIEW  LOCATION: [facility]  DATE: 11/18/2023    INDICATION: declining health, rule out subclinical pneumonia  COMPARISON: None.      Impression    IMPRESSION: Heart size within normal limits for AP technique. Pulmonary vascularity normal. Moderate size esophageal hiatal hernia. The lungs are clear. No infiltrates or effusions.  shunt tubing overlying the right chest. IVC filter.        Discharge Medications   Current Discharge Medication List        START taking these medications    Details   !! acetaminophen (TYLENOL) 325 MG tablet Take 2 tablets (650 mg) by mouth every 4 hours as needed for mild pain or other (and adjunct with moderate or severe pain or per patient request)    Associated Diagnoses: Obstructive hydrocephalus (H)      acetaminophen (TYLENOL) 650 MG suppository Place 1 suppository (650 mg) rectally every 4 hours as needed for mild pain or other (and adjunct with moderate or severe pain or per patient request)    Associated Diagnoses: Obstructive hydrocephalus (H)       !! - Potential duplicate medications found. Please discuss with provider.        CONTINUE these medications which have NOT CHANGED    Details   !! acetaminophen (TYLENOL) 325 MG tablet Take 650 mg by mouth every 8 hours as needed for pain      aspirin 81 MG EC tablet Take 81 mg by mouth daily      clopidogrel (PLAVIX) 75 MG tablet Take 75 mg by mouth daily Cerebral aneurysm, nonruptured      ferrous sulfate (FE TABS) 325 (65 Fe) MG EC tablet Take 325 mg by mouth daily      levETIRAcetam (KEPPRA) 100 MG/ML oral solution Take 10 mLs by mouth 2 times daily Cerebral aneurysm, nonruptured      lisinopril (ZESTRIL) 5 MG tablet Take 5 mg by mouth daily HOLD for systolic BP <110      melatonin 5 MG tablet Take 5 mg by mouth at bedtime      metoprolol tartrate (LOPRESSOR) 25 MG tablet Take 75 mg by mouth 2 times daily With food      nicotine (NICODERM CQ) 21 MG/24HR 24 hr patch Place 1 patch onto the skin every 24 hours      pantoprazole (PROTONIX) 40 MG EC tablet Take 40 mg by mouth daily      polyethylene glycol (MIRALAX) 17 g packet Take 17 g by mouth daily as needed for constipation      sennosides (SENOKOT) 8.6 MG tablet Take 1 tablet by mouth 2 times daily as needed for constipation      vitamin C (ASCORBIC ACID) 500 MG tablet Take 500 mg by mouth daily       !! - Potential duplicate medications found. Please  discuss with provider.        Allergies   No Known Allergies

## 2023-11-20 NOTE — PLAN OF CARE
Pt here w/ L facial droop and generalized weakness; Concerns of hydrocephalus, possibly related to  shunt malfunction.      Pt is alert and oriented x 2- 3, disoriented to situation and place; forgetful, baseline weakness; able to speak but hesitant,  pt unable to follow certain commands, slight L facial droop, aphasic, difficulty communicating w/ words; hesitant, drift present in BUEs, speech/ neuros wax and wane     H/x of SAH, dysphagia, chronic hemiparesis on L side     A2, w/ lift; Q2Ts, purewick in place      VSS on RA     INC/ INC    Refused pain interventions, did not want Tylenol, heat applied and repositioning, pt still reports 6/10 of pain     Pureed diet, thin liquids, takes pills whole in pudding/ applesauce    Skin WDL except for scattered bruising and pressure sore on sacrum/ coccyx; Skin barrier applied    Plan to discharge today to [facility], discharge pending

## 2023-11-20 NOTE — PROGRESS NOTES
Care Management Discharge Note    Discharge Date: 11/20/2023       Discharge Disposition:  [facility]    Discharge Services:      Discharge DME:      Discharge Transportation:  [facility] stretcher due to  chronic hemiparesis on left side.  She is unable to sit upright due to no trunk support.  PCS completed, faxed and placed on pt chart for time of transport.between 6:11pm-6:56pm today    Private pay costs discussed: Not applicable    Does the patient's insurance plan have a 3 day qualifying hospital stay waiver?  No    PAS Confirmation Code:  n/a  Patient/family educated on Medicare website which has current facility and service quality ratings:      Education Provided on the Discharge Plan:  yes  Persons Notified of Discharge Plans: Hospitalist, ARTURO,RN.ANDRE,BB  Patient/Family in Agreement with the Plan:  yes  SW spoke with daughter to confirm discharge plan    Handoff Referral Completed: Yes    Additional Information:  Discharge orders in [facility] and sent to [facility].  FARZANA confirmed final discharge time with Abraham in admissions at [facility].    KEKE Delatorre  [facility]  Care Transitions  [phone]

## 2023-11-20 NOTE — PLAN OF CARE
Speech Language Therapy Discharge Summary    Reason for therapy discharge:    Discharged to long term care facility.    Progress towards therapy goal(s). See goals on Care Plan in [facility] electronic health record for goal details.  Goals not met.  Barriers to achieving goals:   discharge from facility.    Therapy recommendation(s):    Continued therapy is recommended.  Rationale/Recommendations:  Continue short term SLP needs or dysphagia management. Discharged on a puree diet and thin liquids. Supervision due to bolus holding and potential for pocketing.

## 2023-11-20 NOTE — PROGRESS NOTES
Pt discharging this evening VIA transport and stretcher. Personal belongings packed up. RN spoke with daughter and gave update.     Pt is discharging now 20:07

## 2023-11-20 NOTE — PLAN OF CARE
Physical Therapy Discharge Summary    Reason for therapy discharge:    Discharged to long term care facility.    Progress towards therapy goal(s). See goals on Care Plan in [facility] electronic health record for goal details.  Goals not met.  Barriers to achieving goals:   discharge from facility.    Therapy recommendation(s):    Continued therapy is recommended.  Rationale/Recommendations:   .     PT Discharge Planning:    PT Plan: Indep w/bed mob, transfers  PT Discharge Recommendation (DC Rec): Collaborated with PT for updated discharge location, Transitional Care Facility, home with assist, home with home care physical therapy, Long term care facility  PT Rationale for DC Rec: Unclear level of assist at baseline, Ax1-2. Per dtr, pt is below her baseline over last 2-3 weeks needing Ax2 w.overhead valente. Prior to that, she had been using Ax2 for stand pivot transfers to wheelchair. Pt currently requiring mod-max A x2 for bed mob and transfers using SaraSteady device. Pt exhibits incr tone, decr initiation of movement, she pushes backwards in sitting and standing and does not engage verbally during session. WIth verbal cues does appear at times able to initiate some movemetns; bring L UE on top of her head, able to kick B LEs while seated, able to sit briefly edge of bed unsupported. If d/c back to a facility, she requires Ax2 and overhead valente for safety and would benefit from HHPT to reduce falsl risk and maximize fn outcomes. Pending pt and family wishes, might benefit from HHPT to progress indep, maximize quality of life. Might benefit from TCUpending medical plan, pt and family wishes.  PT Brief overview of current status: Ax2 w/Asha Steady  PT Equipment Needed at Discharge: lift device    Recommendation above provided by last treating therapist.

## 2024-01-01 ENCOUNTER — DOCUMENTATION ONLY (OUTPATIENT)
Dept: OTHER | Facility: CLINIC | Age: 69
End: 2024-01-01
Payer: COMMERCIAL